# Patient Record
Sex: MALE | Race: WHITE | NOT HISPANIC OR LATINO | Employment: FULL TIME | URBAN - METROPOLITAN AREA
[De-identification: names, ages, dates, MRNs, and addresses within clinical notes are randomized per-mention and may not be internally consistent; named-entity substitution may affect disease eponyms.]

---

## 2017-05-08 ENCOUNTER — ALLSCRIPTS OFFICE VISIT (OUTPATIENT)
Dept: OTHER | Facility: OTHER | Age: 37
End: 2017-05-08

## 2017-05-08 ENCOUNTER — GENERIC CONVERSION - ENCOUNTER (OUTPATIENT)
Dept: OTHER | Facility: OTHER | Age: 37
End: 2017-05-08

## 2017-11-06 ENCOUNTER — GENERIC CONVERSION - ENCOUNTER (OUTPATIENT)
Dept: OTHER | Facility: OTHER | Age: 37
End: 2017-11-06

## 2018-01-12 NOTE — PROGRESS NOTES
Assessment    1  Encounter for preventive health examination (V70 0) (Z00 00)   2  Elevated blood pressure reading (796 2) (R03 0)   3  Frequent stools (787 91) (K52 9)    Plan  Elevated blood pressure reading    · (Q) CBC (H/H, RBC, INDICES, WBC, PLT); Status:Active; Requested for:08May2017;    · (Q) COMPREHENSIVE METABOLIC PNL W/ADJUSTED CALCIUM; Status:Active; Requested for:08May2017;    · (Q) LIPID PANEL WITH REFLEX TO DIRECT LDL; Status:Active; Requested  for:08May2017;    · (Q) TSH, 3RD GENERATION W/REFLEX TO FT4; Status:Active;  Requested  for:08May2017;   Frequent stools    · *1 -  [facility]  *  Status: Active   Requested for: 63EFX4442  Care Summary provided  : Yes  Health Maintenance    · Follow-up visit in 1 year Evaluation and Treatment  Follow-up  Status: Hold For -  Scheduling  Requested for: 33YXT9851   · Always use a seat belt and shoulder strap when riding or driving a motor vehicle ;  Status:Complete;   Done: 15CPI0531   · Begin a limited exercise program ; Status:Complete;   Done: 18OAQ2601   · Brush your teeth 3 times a day and floss at least once a day ; Status:Complete;   Done:  49KUR9045   · Drink plenty of fluids ; Status:Complete;   Done: 39BGF6865   · Eat a low fat and low cholesterol diet ; Status:Complete;   Done: 04WLT5095   · Keep a diary of when and what you eat ; Status:Complete;   Done: 57YKR2995   · Some eating tips that can help you lose weight ; Status:Complete;   Done: 81TQN7248   · Use a sun block product with an SPF of 15 or more ; Status:Complete;   Done:  03XLW9802   · Vitamins can help you get daily requirements that your diet may not be giving you ;  Status:Complete;   Done: 68WKQ2195   · We recommend routine visits to a dentist ; Status:Complete;   Done: 87KRX6777   · We recommend that you bring your body mass index down to 26 ; Status:Complete;    Done: 18WEI9583   · We recommend you modify your diet to achieve and maintain a healthy weight  Being  underweight may increase your risk of developing health problems from vitamin and  mineral deficiencies  We recommend a balanced diet rich in fruits and vegetables  You  may also consider increasing your calorie intake by eating more frequently or adding  nuts, avocados, and low-fat cheese or milk to your meals  Please let us know  if you would like to learn more about your nutrition and calorie needs, and additional  options to help you achieve your weight goals ; Status:Complete;   Done: 92ODU1790   · Call [phone] if: You have any warning signs of skin cancer ; Status:Complete;    Done: 22JSD2644    Discussion/Summary  Impression: health maintenance visit  Currently, he eats a healthy diet and eats an adequate diet  Prostate cancer screening: the risks and benefits of prostate cancer screening were discussed and PSA is not indicated  Testicular cancer screening: the risks and benefits of testicular cancer screening were discussed and monthly self testicular exam was advised  Colorectal cancer screening: the risks and benefits of colorectal cancer screening were discussed and colorectal cancer screening is not indicated  Screening lab work includes glucose and lipid profile  The patient declines immunizations  He was advised to be evaluated by an ophthalmologist and a dentist  Advice and education were given regarding weight bearing exercise, weight loss, calcium supplements, vitamin D supplements, alcohol use and sunscreen use  Patient discussion: discussed with the patient  Chief Complaint  PT here for new PCP and Physical      History of Present Illness  HM, Adult Male: The patient is being seen for a health maintenance evaluation  The last health maintenance visit was few year(s) ago  General Health: The patient's health since the last visit is described as good  He does not have regular dental visits  He denies vision problems  He denies hearing loss   Immunizations status: not up to date  Lifestyle:  He does not have a healthy diet  He does not have any weight concerns  He does not exercise regularly  He uses tobacco  The patient is a current cigarette smoker  Tobacco Use Duration: 2-3 cigarette pack(s) per day  He consumes alcohol  He denies drug use  Reproductive health:  the patient is sexually active  birth control is not being practiced  He denies erectile dysfunction  Screening: cancer screening reviewed and updated  metabolic screening reviewed and updated  risk screening reviewed and updated  HPI: wife c/o him going to bathroom after he eats more than 4-5 times a day  stool is not watery or loose   denies blood in stool      Review of Systems    Constitutional: No fever or chills, feels well, no tiredness, no recent weight gain or weight loss  Eyes: No complaints of eye pain, no red eyes, no discharge from eyes, no itchy eyes  ENT: no complaints of earache, no hearing loss, no nosebleeds, no nasal discharge, no sore throat, no hoarseness  Cardiovascular: No complaints of slow heart rate, no fast heart rate, no chest pain, no palpitations, no leg claudication, no lower extremity  Respiratory: No complaints of shortness of breath, no wheezing, no cough, no SOB on exertion, no orthopnea or PND  Gastrointestinal: No complaints of abdominal pain, no constipation, no nausea or vomiting, no diarrhea or bloody stools  Genitourinary: No complaints of dysuria, no incontinence, no hesitancy, no nocturia, no genital lesion, no testicular pain  Musculoskeletal: No complaints of arthralgia, no myalgias, no joint swelling or stiffness, no limb pain or swelling  Integumentary: No complaints of skin rash or skin lesions, no itching, no skin wound, no dry skin  Neurological: No compliants of headache, no confusion, no convulsions, no numbness or tingling, no dizziness or fainting, no limb weakness, no difficulty walking     Psychiatric: Is not suicidal, no sleep disturbances, no anxiety or depression, no change in personality, no emotional problems  Endocrine: No complaints of proptosis, no hot flashes, no muscle weakness, no erectile dysfunction, no deepening of the voice, no feelings of weakness  Hematologic/Lymphatic: No complaints of swollen glands, no swollen glands in the neck, does not bleed easily, no easy bruising  Past Medical History    · History of Bipolar disorder (296 80) (F31 9)   · History of STD (male) (099 9) (A64)    Surgical History    · Denied: History Of Prior Surgery    Family History  Brother    · Family history of Bipolar disorder  Maternal Grandfather    · Family history of cerebrovascular accident (CVA) (V17 1) (Z82 3)    Social History    · Occasional cigarette smoker (305 1) (Z72 0)   · Social drinker (V49 89) (Z78 9)    Allergies    1  No Known Drug Allergies    2  Seasonal    Vitals   Recorded: 82YLK7615 11:41AM Recorded: 37DGY1012 10:50AM   Heart Rate  84   Respiration  16   Systolic 731 mm Hg, LUE, Sitting 527 mm Hg   Diastolic 90 mm Hg, LUE, Sitting 92 mm Hg   Height  5 ft 9 21 in   Weight  222 lb    BMI Calculated  32 59 kg/m2   BSA Calculated  2 16 m2     Physical Exam    Constitutional   General appearance: No acute distress, well appearing and well nourished  Head and Face   Head and face: Normal     Palpation of the face and sinuses: No sinus tenderness  Eyes   Conjunctiva and lids: No erythema, swelling or discharge  Pupils and irises: Equal, round, reactive to light  Ophthalmoscopic examination: Normal fundi and optic discs  Ears, Nose, Mouth, and Throat   External inspection of ears and nose: Normal     Otoscopic examination: Tympanic membranes translucent with normal light reflex  Canals patent without erythema  Hearing: Normal     Nasal mucosa, septum, and turbinates: Normal without edema or erythema  Lips, teeth, and gums: Normal, good dentition      Oropharynx: Normal with no erythema, edema, exudate or lesions  Neck   Neck: Supple, symmetric, trachea midline, no masses  Thyroid: Normal, no thyromegaly  Pulmonary   Respiratory effort: No increased work of breathing or signs of respiratory distress  Percussion of chest: Normal     Palpation of chest: Normal     Auscultation of lungs: Clear to auscultation  Cardiovascular   Palpation of heart: Normal PMI, no thrills  Auscultation of heart: Normal rate and rhythm, normal S1 and S2, no murmurs  Carotid pulses: 2+ bilaterally  Examination of extremities for edema and/or varicosities: Normal     Lymphatic   Palpation of lymph nodes in neck: No lymphadenopathy  Palpation of lymph nodes in axillae: No lymphadenopathy  Musculoskeletal   Gait and station: Normal     Inspection/palpation of digits and nails: Normal without clubbing or cyanosis  Inspection/palpation of joints, bones, and muscles: Normal     Range of motion: Normal     Stability: Normal     Muscle strength/tone: Normal     Skin   Skin and subcutaneous tissue: Normal without rashes or lesions  Palpation of skin and subcutaneous tissue: Normal turgor  Neurologic   Cranial nerves: Cranial nerves 2-12 intact  Cortical function: Normal mental status  Reflexes: 2+ and symmetric  Sensation: No sensory loss  Coordination: Normal finger to nose and heel to shin  Psychiatric   Judgment and insight: Normal     Orientation to person, place and time: Normal     Recent and remote memory: Intact  Mood and affect: Normal        Results/Data  PHQ-2 Adult Depression Screening 02EDN5121 10:57AM User, Ahs     Test Name Result Flag Reference   PHQ-2 Adult Depression Score 0     Over the last two weeks, how often have you been bothered by any of the following problems?   Little interest or pleasure in doing things: Not at all - 0  Feeling down, depressed, or hopeless: Not at all - 0   PHQ-2 Adult Depression Screening Negative         Future Appointments    Date/Time Provider Specialty Site   06/15/2017 06:00 PM Iliana Nunez MD [address]   Electronically signed by : Andrés Ferrari MD; May  8 2017 11:45AM EST                       (Author)

## 2018-01-13 NOTE — MISCELLANEOUS
Message  GI Reminder Recall [facility]:   Date: 11/06/2017   Dear Vega BARRY:     Review of our records shows you are due for the following: Consult  Please call the following office to schedule your appointment:   [address] [facility], [address], [place], [address] [place] [address]  [phone]  We look forward to hearing from you!      Sincerely,     [facility]      Signatures   Electronically signed by : Jonathan Caputo, ; Nov 6 2017  4:10PM EST                       (Author)

## 2018-01-14 VITALS
BODY MASS INDEX: 32.88 KG/M2 | HEART RATE: 84 BPM | SYSTOLIC BLOOD PRESSURE: 132 MMHG | RESPIRATION RATE: 16 BRPM | WEIGHT: 222 LBS | HEIGHT: 69 IN | DIASTOLIC BLOOD PRESSURE: 90 MMHG

## 2020-07-30 ENCOUNTER — APPOINTMENT (OUTPATIENT)
Dept: RADIOLOGY | Facility: CLINIC | Age: 40
End: 2020-07-30
Payer: COMMERCIAL

## 2020-07-30 ENCOUNTER — OFFICE VISIT (OUTPATIENT)
Dept: URGENT CARE | Facility: CLINIC | Age: 40
End: 2020-07-30
Payer: COMMERCIAL

## 2020-07-30 VITALS
HEIGHT: 71 IN | SYSTOLIC BLOOD PRESSURE: 134 MMHG | TEMPERATURE: 97.9 F | WEIGHT: 253 LBS | RESPIRATION RATE: 20 BRPM | BODY MASS INDEX: 35.42 KG/M2 | DIASTOLIC BLOOD PRESSURE: 92 MMHG | HEART RATE: 97 BPM

## 2020-07-30 DIAGNOSIS — M25.561 ACUTE PAIN OF RIGHT KNEE: Primary | ICD-10-CM

## 2020-07-30 DIAGNOSIS — M25.561 ACUTE PAIN OF RIGHT KNEE: ICD-10-CM

## 2020-07-30 PROCEDURE — 73564 X-RAY EXAM KNEE 4 OR MORE: CPT

## 2020-07-30 RX ORDER — ESCITALOPRAM OXALATE 20 MG/1
20 TABLET ORAL DAILY
COMMUNITY

## 2020-07-30 NOTE — PATIENT INSTRUCTIONS
Rest, ice, and elevate right knee  Tylenol and Motrin as needed for pain or swelling   Follow up with orthopedics if pain persists    Knee Pain   WHAT YOU NEED TO KNOW:   Knee pain may start suddenly, or it may be a long-term problem  You may have pain on the side, front, or back of your knee  You may have knee stiffness and swelling  You may hear popping sounds or feel like your knee is giving way or locking up as you walk  You may feel pain when you sit, stand, walk, or climb up and down stairs  Knee pain can be caused by conditions such as obesity, inflammation, or strains or tears in ligaments or tendons  DISCHARGE INSTRUCTIONS:   Follow up with your healthcare provider within 24 hours or as directed: You may need follow-up treatments, such as steroid injections to decrease pain  Write down your questions so you remember to ask them during your visits  Self-care:   · Rest  your knee so it can heal  Limit activities that increase your pain  · Ice  can help reduce swelling  Wrap ice in a towel and put it on your knee for as long and as often as directed  · Compression  with a brace or bandage can help reduce swelling  Use a brace or bandage only as directed  · Elevation  helps decrease pain and swelling  Elevate your knee while you are sitting or lying down  Prop your leg on pillows to keep your knee above the level of your heart  Medicines:   · NSAIDs  help decrease swelling and pain or fever  This medicine is available with or without a doctor's order  NSAIDs can cause stomach bleeding or kidney problems in certain people  If you take blood thinner medicine, always ask your healthcare provider if NSAIDs are safe for you  Always read the medicine label and follow directions  · Acetaminophen  decreases pain and fever  It is available without a doctor's order  Ask how much to take and when to take it  Follow directions  Acetaminophen can cause liver damage if not taken correctly       · Take your medicine as directed  Contact your healthcare provider if you think your medicine is not helping or if you have side effects  Tell him or her if you are allergic to any medicine  Keep a list of the medicines, vitamins, and herbs you take  Include the amounts, and when and why you take them  Bring the list or the pill bottles to follow-up visits  Carry your medicine list with you in case of an emergency  Exercise as directed: You may need to see a physical therapist or do recommended exercises to improve movement and decrease your pain  You may be directed to walk, swim, or ride a bike  Follow your exercise plan exactly as directed to avoid further injury  Contact your healthcare provider if:   · You have questions or concerns about your condition or care  Return to the emergency department if:   · Your pain is worse, even after treatment  · You cannot bend or straighten your leg completely  · The swelling around your knee does not go down even with treatment  · Your knee is painful and hot to the touch  © 2017 [address] Information is for End User's use only and may not be sold, redistributed or otherwise used for commercial purposes  All illustrations and images included in CareNotes® are the copyrighted property of [employer] , [employer]  or [employer]  The above information is an  only  It is not intended as medical advice for individual conditions or treatments  Talk to your doctor, nurse or pharmacist before following any medical regimen to see if it is safe and effective for you

## 2020-07-30 NOTE — LETTER
July 30, 2020     Patient: Henry Cooper   YOB: 1980   Date of Visit: 7/30/2020       To Whom it May Concern:    Syeda Bazan was seen in my clinic on 7/30/2020  He may return to work on 8/1/2020  If you have any questions or concerns, please don't hesitate to call           Sincerely,          Minor Anger, CRISTINA      CC: No Recipients

## 2020-07-31 NOTE — PROGRESS NOTES
[facility]        NAME: Ines Ng is a 46 y o  male  : 1980    MRN: [id number]  DATE: 2020  TIME: 7:57 AM    Assessment and Plan   Acute pain of right knee [M25 561]  1  Acute pain of right knee  XR knee 4+ vw right injury     Work note provided for today and tomorrow  Patient asked if it could state "Covid free"  I informed him that he did not receive a Covid evaluation or test today to test for the virus  He asked for a test but was informed of the several day turn around time and the possibility of the out of pocket expense as he is asymptomatic  He declined at this time  Patient Instructions   Rest, ice, and elevate right knee  Tylenol and Motrin as needed for pain or swelling   Follow up with orthopedics if pain persists    Follow up with PCP in 3-5 days  Proceed to  ER if symptoms worsen  Chief Complaint     Chief Complaint   Patient presents with    Knee Injury     fell down stairs a couple hours ago  R knee         History of Present Illness       Maurice is a 46year old male presenting with right proximal knee pain  His knee gave out causing him to fall down 2 stairs  He landed on his right side  He got up without assistance and has been ambulating since fall  No OTC medications  Denies swelling, ecchymosis, N/T/W  Reports prior right knee injury in the past  He is also requesting a note for work for today and tomorrow  Review of Systems   Review of Systems   Constitutional: Negative for activity change, chills and fever  Respiratory: Negative for cough and shortness of breath  Musculoskeletal: Positive for arthralgias and gait problem  Skin: Negative for color change and wound  Neurological: Negative for weakness, numbness and headaches           Current Medications       Current Outpatient Medications:     escitalopram (LEXAPRO) 20 mg tablet, Take 20 mg by mouth daily, Disp: , Rfl:     Current Allergies     Allergies as of 2020    (No Known Allergies)            The following portions of the patient's history were reviewed and updated as appropriate: allergies, current medications, past family history, past medical history, past social history, past surgical history and problem list      Past Medical History:   Diagnosis Date    Anxiety        No past surgical history on file  Family History   Problem Relation Age of Onset    No Known Problems Mother     No Known Problems Father          Medications have been verified  Objective   /92 (Patient Position: Sitting)   Pulse 97   Temp 97 9 °F (36 6 °C) (Temporal)   Resp 20   Ht 5' 11" (1 803 m)   Wt 115 kg (253 lb)   BMI 35 29 kg/m²      Right knee xray: No osseous abnormality   Physical Exam     Physical Exam   Constitutional: He is oriented to person, place, and time  He appears well-developed and well-nourished  He is active  No distress  HENT:   Head: Normocephalic  Cardiovascular: Normal rate and regular rhythm  Pulmonary/Chest: Effort normal  No respiratory distress  Musculoskeletal:        Right knee: He exhibits normal range of motion, no swelling, no effusion, no ecchymosis, no deformity, no laceration, no erythema, normal alignment, no LCL laxity and no MCL laxity  Legs:  Neurological: He is alert and oriented to person, place, and time  He is not disoriented  Skin: Skin is warm, dry and intact

## 2020-08-13 ENCOUNTER — TELEPHONE (OUTPATIENT)
Dept: URGENT CARE | Facility: CLINIC | Age: 40
End: 2020-08-13

## 2020-08-13 DIAGNOSIS — Z20.89 CONTACT WITH AND (SUSPECTED) EXPOSURE TO OTHER COMMUNICABLE DISEASES: ICD-10-CM

## 2020-08-13 DIAGNOSIS — B34.9 INFECTION VIRAL: ICD-10-CM

## 2020-08-13 PROCEDURE — U0003 INFECTIOUS AGENT DETECTION BY NUCLEIC ACID (DNA OR RNA); SEVERE ACUTE RESPIRATORY SYNDROME CORONAVIRUS 2 (SARS-COV-2) (CORONAVIRUS DISEASE [COVID-19]), AMPLIFIED PROBE TECHNIQUE, MAKING USE OF HIGH THROUGHPUT TECHNOLOGIES AS DESCRIBED BY CMS-2020-01-R: HCPCS | Performed by: INTERNAL MEDICINE

## 2020-08-14 LAB — SARS-COV-2 RNA SPEC QL NAA+PROBE: NOT DETECTED

## 2020-09-24 DIAGNOSIS — Z20.89 CONTACT WITH AND (SUSPECTED) EXPOSURE TO OTHER COMMUNICABLE DISEASES: ICD-10-CM

## 2020-09-24 DIAGNOSIS — B34.9 INFECTION VIRAL: ICD-10-CM

## 2020-09-24 PROCEDURE — U0003 INFECTIOUS AGENT DETECTION BY NUCLEIC ACID (DNA OR RNA); SEVERE ACUTE RESPIRATORY SYNDROME CORONAVIRUS 2 (SARS-COV-2) (CORONAVIRUS DISEASE [COVID-19]), AMPLIFIED PROBE TECHNIQUE, MAKING USE OF HIGH THROUGHPUT TECHNOLOGIES AS DESCRIBED BY CMS-2020-01-R: HCPCS | Performed by: INTERNAL MEDICINE

## 2020-09-26 LAB — SARS-COV-2 RNA SPEC QL NAA+PROBE: NOT DETECTED

## 2020-11-06 DIAGNOSIS — B34.9 INFECTION VIRAL: ICD-10-CM

## 2020-11-06 DIAGNOSIS — Z20.89 CONTACT WITH AND (SUSPECTED) EXPOSURE TO OTHER COMMUNICABLE DISEASES: ICD-10-CM

## 2020-11-06 PROCEDURE — U0003 INFECTIOUS AGENT DETECTION BY NUCLEIC ACID (DNA OR RNA); SEVERE ACUTE RESPIRATORY SYNDROME CORONAVIRUS 2 (SARS-COV-2) (CORONAVIRUS DISEASE [COVID-19]), AMPLIFIED PROBE TECHNIQUE, MAKING USE OF HIGH THROUGHPUT TECHNOLOGIES AS DESCRIBED BY CMS-2020-01-R: HCPCS | Performed by: INTERNAL MEDICINE

## 2020-11-07 LAB — SARS-COV-2 RNA SPEC QL NAA+PROBE: NOT DETECTED

## 2021-03-08 ENCOUNTER — HOSPITAL ENCOUNTER (EMERGENCY)
Facility: HOSPITAL | Age: 41
Discharge: HOME | End: 2021-03-08
Attending: EMERGENCY MEDICINE
Payer: OTHER MISCELLANEOUS

## 2021-03-08 ENCOUNTER — APPOINTMENT (EMERGENCY)
Dept: RADIOLOGY | Facility: HOSPITAL | Age: 41
End: 2021-03-08
Payer: OTHER MISCELLANEOUS

## 2021-03-08 VITALS
RESPIRATION RATE: 18 BRPM | SYSTOLIC BLOOD PRESSURE: 155 MMHG | HEART RATE: 85 BPM | DIASTOLIC BLOOD PRESSURE: 89 MMHG | TEMPERATURE: 98.5 F | OXYGEN SATURATION: 97 %

## 2021-03-08 DIAGNOSIS — S42.009A CLAVICLE FRACTURE: Primary | ICD-10-CM

## 2021-03-08 PROCEDURE — 73030 X-RAY EXAM OF SHOULDER: CPT

## 2021-03-08 PROCEDURE — 99284 EMERGENCY DEPT VISIT MOD MDM: CPT | Performed by: PHYSICIAN ASSISTANT

## 2021-03-08 PROCEDURE — 99283 EMERGENCY DEPT VISIT LOW MDM: CPT

## 2021-03-08 RX ORDER — NAPROXEN 500 MG/1
500 TABLET ORAL 2 TIMES DAILY WITH MEALS
Qty: 30 TABLET | Refills: 0 | Status: SHIPPED | OUTPATIENT
Start: 2021-03-08 | End: 2021-07-06

## 2021-03-08 NOTE — Clinical Note
Julissa Bautista was seen and treated in our emergency department on 3/8/2021  Diagnosis:     Belén Evans  may return to work on return date  He may return on this date: 03/10/2021    Belén Evans will require a sling and may not use his right arm until cleared by Orthopedics  If you have any questions or concerns, please don't hesitate to call        Krunal Baltazar PA-C    ______________________________           _______________          _______________  Hospital Representative                              Date                                Time

## 2021-03-08 NOTE — ED PROVIDER NOTES
History  Chief Complaint   Patient presents with    Shoulder Injury     PT "lifted propane can at work and injured right shoulder"     The patient is a 56-year-old male with no significant past medical history who presents to the emergency department for evaluation of right shoulder injury  The patient reports he was at work lifting a heavy propane tank, and his  was not adequate causing the majority of the weight to pull onto his right shoulder  He reports he has been having a significant amount of pain since, and it is difficult to move his shoulder because of pain  He took 600 mg of Motrin, which she states did somewhat help as long as he is not moving his shoulder  The patient states he is left-hand dominant  He denies any numbness or tingling to right arm  He has no further complaints at this time  History provided by:  Patient   used: No    Shoulder Injury  Associated symptoms: no back pain, no fever and no neck pain        Prior to Admission Medications   Prescriptions Last Dose Informant Patient Reported? Taking?   escitalopram (LEXAPRO) 20 mg tablet  Self Yes Yes   Sig: Take 20 mg by mouth daily      Facility-Administered Medications: None       Past Medical History:   Diagnosis Date    Anxiety        History reviewed  No pertinent surgical history  Family History   Problem Relation Age of Onset    No Known Problems Mother     No Known Problems Father      I have reviewed and agree with the history as documented  E-Cigarette/Vaping    E-Cigarette Use Never User      E-Cigarette/Vaping Substances     Social History     Tobacco Use    Smoking status: Current Every Day Smoker     Packs/day: 0 25    Smokeless tobacco: Never Used   Substance Use Topics    Alcohol use: Yes     Comment: Occasional     Drug use: Never     Comment: Illicit drugs:   none  - As per [place]        Review of Systems   Constitutional: Negative for chills and fever     HENT: Negative for ear pain and sore throat  Eyes: Negative for redness and visual disturbance  Respiratory: Negative for cough, shortness of breath and wheezing  Cardiovascular: Negative for chest pain  Gastrointestinal: Negative for abdominal pain, diarrhea, nausea and vomiting  Genitourinary: Negative for dysuria and hematuria  Musculoskeletal: Positive for arthralgias and joint swelling  Negative for back pain, neck pain and neck stiffness  Skin: Negative for color change, rash and wound  Neurological: Negative for dizziness, light-headedness, numbness and headaches  Hematological: Does not bruise/bleed easily  All other systems reviewed and are negative  Physical Exam  Physical Exam  Constitutional:       Appearance: Normal appearance  HENT:      Head: Normocephalic and atraumatic  Nose: Nose normal    Eyes:      Extraocular Movements: Extraocular movements intact  Conjunctiva/sclera: Conjunctivae normal    Neck:      Musculoskeletal: Normal range of motion and neck supple  Pulmonary:      Effort: Pulmonary effort is normal  No respiratory distress  Musculoskeletal:      Right shoulder: He exhibits decreased range of motion, tenderness and bony tenderness  Right elbow: Normal        Arms:    Skin:     General: Skin is warm and dry  Neurological:      General: No focal deficit present  Mental Status: He is alert and oriented to person, place, and time  Sensory: No sensory deficit  Motor: Motor function is intact           Vital Signs  ED Triage Vitals [03/08/21 1355]   Temperature Pulse Respirations Blood Pressure SpO2   98 5 °F (36 9 °C) 89 18 170/87 98 %      Temp Source Heart Rate Source Patient Position - Orthostatic VS BP Location FiO2 (%)   Oral Monitor Sitting Left arm --      Pain Score       7           Vitals:    03/08/21 1355 03/08/21 1506   BP: 170/87 155/89   Pulse: 89 85   Patient Position - Orthostatic VS: Sitting Lying         Visual Acuity      ED Medications  Medications - No data to display    Diagnostic Studies  Results Reviewed     None                 XR shoulder 2+ views RIGHT   ED Interpretation by Maryam Lara PA-C (03/08 1506)   No acute bony abnormality       Final Result by Falguni Guajardo MD (03/08 1600)      Nondisplaced spiral fracture of the mid diaphysis of the clavicle  I personally discussed this study with Patricio Mueller on 3/8/2021 at 4:00 PM                   Workstation performed: [id number]                    Procedures  Orthopedic injury treatment    Date/Time: 3/8/2021 4:31 PM  Performed by: Maryam Lara PA-C  Authorized by: Maryam Lara PA-C     Patient Location:  ED  Other Assisting Provider: No    Verbal consent obtained?: Yes    Risks and benefits: Risks, benefits and alternatives were discussed    Consent given by:  Patient  Patient states understanding of procedure being performed: Yes    Patient identity confirmed:  Verbally with patient  Injury location:  Sternoclavicular  Location details:  Right clavicle  Injury type:  Fracture  Neurovascular status: Neurovascularly intact    Manipulation performed?: No    Immobilization:  Sling  Neurovascular status: Neurovascularly intact    Patient tolerance:  Patient tolerated the procedure well with no immediate complications             ED Course  ED Course as of Mar 08 1635   Mon Mar 08, 2021   1550 Patient is now able to range his shoulder he was on my initial evaluation, range of motion is still limited  Sling ordered  Jona a call from Radiology  Patient has a fractured clavicle  Patient was placed in a sling  Patient was given Orthopedic follow-up  MDM  Number of Diagnoses or Management Options  Clavicle fracture: new and requires workup  Diagnosis management comments: Patient presents for evaluation of right shoulder pain after sustaining an injury at work      Differential includes but is not limited to fracture versus dislocation versus muscle strain versus muscle tear versus ligament/tendon injury  X-ray of right shoulder ordered  Patient declines anything for pain in the emergency department  X-ray reviewed and discussed with Radiology  There is a mid clavicular fracture  I discussed this with the patient  Patient was placed in a sling and remains neurovascularly intact  There is no tenting of the skin  Patient was given information for orthopedics and advised follow-up  Patient is stable for discharge  Amount and/or Complexity of Data Reviewed  Tests in the radiology section of CPT®: ordered and reviewed  Decide to obtain previous medical records or to obtain history from someone other than the patient: yes  Review and summarize past medical records: yes  Discuss the patient with other providers: yes    Risk of Complications, Morbidity, and/or Mortality  Presenting problems: low  Diagnostic procedures: low  Management options: low    Patient Progress  Patient progress: stable      Disposition  Final diagnoses:   Clavicle fracture     Time reflects when diagnosis was documented in both MDM as applicable and the Disposition within this note     Time User Action Codes Description Comment    3/8/2021  4:07 PM Latanya [place] Add [id number] [place] Clavicle fracture       ED Disposition     ED Disposition Condition Date/Time Comment    Discharge Stable Mon Mar 8, 2021  4:07 PM Irina Breath discharge to home/self care              Follow-up Information     Follow up With Specialties Details Why Contact Info Additional [address] [facility] [place] [facility] Schedule an appointment as soon as possible for a visit in 1 week  [address] [facility] [address] [address] [address] [place] [facility] [address], [address], [address], Basil GONZALEZ, [facility] Emergency Department Emergency Medicine  If symptoms worsen [address] [facility] [address] [facility] Emergency Department, [address], [place] [facility] [place], [address]          Discharge Medication List as of 3/8/2021  4:08 PM      START taking these medications    Details   naproxen (NAPROSYN) 500 mg tablet Take 1 tablet (500 mg total) by mouth 2 (two) times a day with meals, Starting Mon 3/8/2021, Normal         CONTINUE these medications which have NOT CHANGED    Details   escitalopram (LEXAPRO) 20 mg tablet Take 20 mg by mouth daily, Historical Med           No discharge procedures on file      PDMP Review     None          ED Provider  Electronically Signed by           Norma Pepe PA-C  03/08/21 2785

## 2021-03-18 ENCOUNTER — OFFICE VISIT (OUTPATIENT)
Dept: OBGYN CLINIC | Facility: MEDICAL CENTER | Age: 41
End: 2021-03-18
Payer: OTHER MISCELLANEOUS

## 2021-03-18 VITALS
DIASTOLIC BLOOD PRESSURE: 96 MMHG | TEMPERATURE: 96.9 F | HEART RATE: 89 BPM | HEIGHT: 71 IN | WEIGHT: 253 LBS | SYSTOLIC BLOOD PRESSURE: 151 MMHG | BODY MASS INDEX: 35.42 KG/M2

## 2021-03-18 DIAGNOSIS — S42.024A CLOSED NONDISPLACED FRACTURE OF SHAFT OF RIGHT CLAVICLE, INITIAL ENCOUNTER: Primary | ICD-10-CM

## 2021-03-18 PROCEDURE — 23500 CLTX CLAVICULAR FX W/O MNPJ: CPT | Performed by: ORTHOPAEDIC SURGERY

## 2021-03-18 PROCEDURE — 99204 OFFICE O/P NEW MOD 45 MIN: CPT | Performed by: ORTHOPAEDIC SURGERY

## 2021-03-18 RX ORDER — IBUPROFEN 200 MG
200 TABLET ORAL EVERY 6 HOURS PRN
COMMUNITY
End: 2021-07-06

## 2021-03-18 RX ORDER — ATORVASTATIN CALCIUM 20 MG/1
20 TABLET, FILM COATED ORAL DAILY
COMMUNITY

## 2021-03-18 NOTE — PROGRESS NOTES
Ortho Sports Medicine Shoulder New Patient Visit     Assesment:   45 y o  male right nondisplaced spiral fracture of the mid diaphysis of clavicle DOI: 3/8/21 with possible nerve root irritation of the neck    Plan:    Conservative treatment:    Ice to shoulder 1-2 times daily, for 20 minutes at a time  PT for ROM and strengthening to shoulder, rotator cuff, scapular stabilizers  Work note written  ED Notes reviewed    Imaging: All imaging from today was reviewed by myself and explained to the patient  4 weeks new x-rays      Injection:    No Injection planned at this time  Surgery:     No surgery is recommended at this point, continue with conservative treatment plan as noted  Follow up:    Return in about 4 weeks (around 4/15/2021) for Recheck  Chief Complaint   Patient presents with    Right Shoulder - Fracture       History of Present Illness: The patient is a 45 y o , right hand dominant male whose occupation is unknown, referred to me by the emergency room, seen in clinic for evaluation of right shoulder pain  The patient denies a history of diabetes  The patient denies a history of thyroid disorder  Pain is located anterior, lateral   The patient rates the pain as a 8/10  The pain has been present for a few days  The patient sustained an injury on 3/8/2021  The mechanism of injury was when he was stacking propane tanks he went swing and put one up and missed  The weight of the tank pulled his arm down and he felt a sharp pain and crack in the shoulder  The pain is characterized as sharp, stabbing, dull, achy  The pain is present at all times  Pain is improved by rest, ice and bracing  Pain is aggravated by overhead activity, reaching back, rotation and lifting   Symptoms include clicking, catching, popping and cracking  The patient has weakness    The patient has numbness and tingling that extends into the fingers     The patient has tried rest, ice, NSAIDS and bracing  Shoulder Surgical History:  None    Past Medical, Social and Family History:  Past Medical History:   Diagnosis Date    Anxiety      History reviewed  No pertinent surgical history  No Known Allergies  Current Outpatient Medications on File Prior to Visit   Medication Sig Dispense Refill    atorvastatin (LIPITOR) 20 mg tablet Take 20 mg by mouth daily      escitalopram (LEXAPRO) 20 mg tablet Take 20 mg by mouth daily      ibuprofen (MOTRIN) 200 mg tablet Take 200 mg by mouth every 6 (six) hours as needed for mild pain      naproxen (NAPROSYN) 500 mg tablet Take 1 tablet (500 mg total) by mouth 2 (two) times a day with meals (Patient not taking: Reported on 3/18/2021) 30 tablet 0     No current facility-administered medications on file prior to visit        Social History     Socioeconomic History    Marital status: Single     Spouse name: Not on file    Number of children: Not on file    Years of education: 12/technical school     Highest education level: Not on file   Occupational History    Occupation:     Social Needs    Financial resource strain: Not on file    Food insecurity     Worry: Not on file     Inability: Not on file   [employer] needs     Medical: Not on file     Non-medical: Not on file   Tobacco Use    Smoking status: Current Every Day Smoker     Packs/day: 0 25    Smokeless tobacco: Never Used   Substance and Sexual Activity    Alcohol use: Yes     Comment: Occasional     Drug use: Never     Comment: Illicit drugs:   none  - As per Hollie Simpson Sexual activity: Not on file   Lifestyle    Physical activity     Days per week: Not on file     Minutes per session: Not on file    Stress: Not on file   Relationships    Social connections     Talks on phone: Not on file     Gets together: Not on file     Attends Sabianist service: Not on file     Active member of club or organization: Not on file     Attends meetings of clubs or organizations: Not on file     Relationship status: Not on file    Intimate partner violence     Fear of current or ex partner: Not on file     Emotionally abused: Not on file     Physically abused: Not on file     Forced sexual activity: Not on file   Other Topics Concern    Not on file   Social History Narrative    · Do you currently or have you served in the [employer]:   No      · Caffeine intake: Moderate      · Guns present in home:   No      · Seat belts used routinely:   Yes      · Sunscreen used routinely:   Yes      · Smoke alarm in home: Yes      · Advance directive:   No     As per Jake Kay      I have reviewed the past medical, surgical, social and family history, medications and allergies as documented in the EMR  Review of systems: ROS is negative other than that noted in the HPI  Constitutional: Negative for fatigue and fever  HENT: Negative for sore throat  Respiratory: Negative for shortness of breath  Cardiovascular: Negative for chest pain  Gastrointestinal: Negative for abdominal pain  Endocrine: Negative for cold intolerance and heat intolerance  Genitourinary: Negative for flank pain  Musculoskeletal: Negative for back pain  Skin: Negative for rash  Allergic/Immunologic: Negative for immunocompromised state  Neurological: Negative for dizziness  Psychiatric/Behavioral: Negative for agitation  Physical Exam:    Blood pressure 151/96, pulse 89, temperature (!) 96 9 °F (36 1 °C), height 5' 11" (1 803 m), weight 115 kg (253 lb)      General/Constitutional: NAD, well developed, well nourished  HENT: Normocephalic, atraumatic  CV: Intact distal pulses, regular rate  Resp: No respiratory distress or labored breathing  Lymphatic: No lymphadenopathy palpated  Neuro: Alert and Oriented x 3, no focal deficits  Psych: Normal mood, normal affect, normal judgement, normal behavior  Skin: Warm, dry, no rashes, no erythema    Shoulder focused exam:    RIGHT LEFT    Scapula Atrophy Negative Negative     Winging Negative Negative     Protraction Negative Negative    Rotator cuff SS 5/5 5/5     IS 5/5 5/5     SubS 5/5 5/5    ROM FF 95     170     ER0 45 60     IRb Iliac crest    T6    TTP: AC Positive Negative     Biceps Negative Negative     Coracoid Negative Negative    Special Tests: O'Briens Negative Negative     Velasquez-shear Negative Negative     Cross body Adduction Negative Negative     Speeds  Negative Negative     Eusebio's Negative Negative     Whipple Negative Negative       Neer Negative Negative     Sweeney Negative Negative    Instability: Apprehension & relocation not tested not tested     Load & shift not tested not tested    Other: Crank Negative Negative           Fracture / Dislocation Treatment    Date/Time: 3/18/2021 11:28 AM  Performed by: Leanna Napier DO  Authorized by: Leanna Napier DO     Patient Location:  Clinic  Other Assisting Provider: No    Verbal consent obtained?: Yes    Risks and benefits: Risks, benefits and alternatives were discussed    Consent given by:  Patient and parent  Patient states understanding of procedure being performed: Yes    Patient's understanding of procedure matches consent: Yes    Procedure consent matches procedure scheduled: Yes    Relevant documents present and verified: Yes    Test results available and properly labeled: Yes    Site marked: Yes    Radiology Images displayed and confirmed   If images not available, report reviewed: Yes    Patient identity confirmed:  Verbally with patient  Time out: Immediately prior to the procedure a time out was called    Injury location:  Sternoclavicular  Location details:  Right clavicle  Injury type:  Fracture  Neurovascular status: Neurovascularly intact    Distal perfusion: normal    Neurological function: normal    Range of motion: normal    Local anesthesia used?: No    Manipulation performed?: No    Immobilization:  Sling  Neurovascular status: Neurovascularly intact    Distal perfusion: normal Neurological function: normal    Range of motion: normal    Patient tolerance:  Patient tolerated the procedure well with no immediate complications        UE NV Exam: +2 Radial pulses bilaterally  Sensation intact to light touch C5-T1 bilaterally, Radial/median/ulnar nerve motor intact    Bilateral elbow, wrist, and and forearm ROM full, painless with passive ROM, no ttp or crepitance throughout extremities below shoulder joint    Cervical ROM is full without pain, numbness or tingling    Shoulder Imaging    X-rays of the right shoulder were reviewed, which demonstrate Nondisplaced spiral fracture of the mid diaphysis of the clavicle  I have reviewed the radiology report and agree with their impression      Scribe Attestation    I,:  Anai Rosario am acting as a scribe while in the presence of the attending physician :       I,:  Ginger Tafoya, DO personally performed the services described in this documentation    as scribed in my presence :

## 2021-03-18 NOTE — LETTER
March 18, 2021     Patient: Vaibhav Carlos   YOB: 1980   Date of Visit: 3/18/2021       To Whom it May Concern:    Wally Encarnacion is under my professional care  He was seen in my office on 3/18/2021  He should remain in the sling and will have no use the right arm at this time  He will return in 4 weeks  Please excuse him from work from 3/10-3/17 due to injury  If you have any questions or concerns, please don't hesitate to call           Sincerely,          Rebecca Gracia DO        CC: No Recipients

## 2021-03-24 ENCOUNTER — EVALUATION (OUTPATIENT)
Dept: PHYSICAL THERAPY | Facility: CLINIC | Age: 41
End: 2021-03-24
Payer: OTHER MISCELLANEOUS

## 2021-03-24 DIAGNOSIS — S42.024A CLOSED NONDISPLACED FRACTURE OF SHAFT OF RIGHT CLAVICLE, INITIAL ENCOUNTER: Primary | ICD-10-CM

## 2021-03-24 PROCEDURE — 97161 PT EVAL LOW COMPLEX 20 MIN: CPT | Performed by: PHYSICAL THERAPIST

## 2021-03-24 NOTE — PROGRESS NOTES
PT Evaluation     Today's date: 3/24/2021  Patient name: Israel Valentin  : 1980  MRN: [id number]  Referring provider: Alexys Jacobs DO  Dx:   Encounter Diagnosis     ICD-10-CM    1  Closed nondisplaced fracture of shaft of right clavicle, initial encounter  S42 024A Ambulatory referral to Physical Therapy                  Assessment  Assessment details: Israel Valentin is a 45 y o  male who presents to physical therapy with pain, decreased UE range of motion, decreased UE strength, impaired function, decreased activity tolerance and poor posture  Patient's clinical presentation is consistent with their referring diagnosis of Closed nondisplaced fracture of shaft of right clavicle, initial encounter  (primary encounter diagnosis)  The pt presents with functional limitations of ADLs, recreational activities, self-care and reaching  Pt would benefit from physical therapy services to address these limitations and maximize function  Pt was instructed and educated on good sitting posture today and demonstrates understanding  Impairments: abnormal muscle firing, abnormal muscle tone, abnormal or restricted ROM, activity intolerance, impaired physical strength, lacks appropriate home exercise program, pain with function, scapular dyskinesis, poor posture  and poor body mechanics  Understanding of Dx/Px/POC: good   Prognosis: good    Goals  Short term goals:  (4 weeks)  1  Patient will have pain level 2/10 right  shoulder at rest  2  Patient will report a 50% improvement in symptoms with ADL's  3  Patient will demonstrate appropriate scapulohumeral rhythm with reaching shoulder height and below  4  Patient will have improved right shoulder ROM by 20 degrees    Long term goals: (8 weeks)  1  Patient will report 85 % improvement improvement in symptoms with ADL's  2  Patient will have pain level 2/10 right shoulder with ADL's   3  Patient will demonstrate appropriate scapulohumeral rhythm with reaching overhead  4  Patient will be independent in a comprehensive home exercise program      Plan  Patient would benefit from: PT eval and skilled physical therapy  Planned modality interventions: cryotherapy and thermotherapy: hydrocollator packs  Planned therapy interventions: joint mobilization, manual therapy, massage, neuromuscular re-education, patient education, postural training, strengthening, stretching, therapeutic activities, ADL training, functional ROM exercises, home exercise program, abdominal trunk stabilization and therapeutic exercise  Frequency: 2x week  Duration in visits: 16  Duration in weeks: 8  Treatment plan discussed with: patient        Subjective Evaluation    History of Present Illness  Date of onset: 3/8/2021  Mechanism of injury: He was stacking propane tanks at work  He went to swing one up to about shoulder height and missed the landing spot  The weight of the tank pulled his arm down and he felt a sharp pain and crack in the right shoulder  He went to [facility]  An x-ray was taken which indicated right clavicle fracture  He was given a sling  He followed up with Dr Ceci Chen  He was then referred to PT  Pain  Current pain ratin  At best pain ratin  At worst pain ratin  Location: Right clavicle region and scapular region  Quality: dull ache, sharp and tight  Relieving factors: rest  Aggravating factors: lifting and overhead activity    Social Support    Employment status: working ( / [employer] worker - lifting)  Hand dominance: left  Exercise history: Video games    Patient Goals  Patient goals for therapy: return to work and independence with ADLs/IADLs          Objective     Postural Observations  Seated posture: fair        Palpation     Right   Hypertonic in the infraspinatus, levator scapulae, pectoralis major, rhomboids, supraspinatus and upper trapezius  Tenderness of the infraspinatus, levator scapulae, pectoralis major, rhomboids, supraspinatus and upper trapezius  Neurological Testing     Sensation     Shoulder   Left Shoulder   Intact: light touch    Right Shoulder   Intact: light touch    Passive Range of Motion   Left Shoulder   Flexion: 180 degrees   Abduction: 180 degrees   External rotation 0°: 90 degrees   Internal rotation 0°: 90 degrees     Right Shoulder   Flexion: 21 degrees   Abduction: 20 degrees   External rotation 0°: -9 degrees   Internal rotation 0°: 50 degrees     Strength/Myotome Testing     Left Shoulder     Planes of Motion   Flexion: 5   Abduction: 5   External rotation at 0°: 5   Internal rotation at 0°: 5     Right Shoulder     Planes of Motion   Flexion: 2+   Abduction: 2+   External rotation at 0°: 3-   Internal rotation at 0°: 3-              Precautions:  Gentle ROM and strengthening      Specialty Daily Treatment Diary       Manuals 3/24/21       Visit # 1       STM R UT, pec group        Gentle PROM R shld                        Warm-up        MH        Neuro Re-Ed        Posture correct        Scap squeeze        Gripping                        Ther Ex        Ball rolls        AROM flex        AROM ER        Cane ABD        Cane flex        Cane ER                Ther Activity                                Modalities        CP

## 2021-03-26 ENCOUNTER — OFFICE VISIT (OUTPATIENT)
Dept: PHYSICAL THERAPY | Facility: CLINIC | Age: 41
End: 2021-03-26
Payer: OTHER MISCELLANEOUS

## 2021-03-26 DIAGNOSIS — S42.024A CLOSED NONDISPLACED FRACTURE OF SHAFT OF RIGHT CLAVICLE, INITIAL ENCOUNTER: Primary | ICD-10-CM

## 2021-03-26 PROCEDURE — 97140 MANUAL THERAPY 1/> REGIONS: CPT | Performed by: PHYSICAL THERAPIST

## 2021-03-26 PROCEDURE — 97110 THERAPEUTIC EXERCISES: CPT | Performed by: PHYSICAL THERAPIST

## 2021-03-26 NOTE — PROGRESS NOTES
Daily Note     Today's date: 3/26/2021  Patient name: Kaushik Martinez  : 1980  MRN: [id number]  Referring provider: Clara Payton DO  Dx:   Encounter Diagnosis     ICD-10-CM    1  Closed nondisplaced fracture of shaft of right clavicle, initial encounter  S42 024A                   Subjective: Pain today = 5/10 right shld      Objective: See treatment diary below      Assessment: Tolerated treatment well  Patient demonstrated fatigue post treatment and would benefit from continued PT      Plan: Continue per plan of care  Progress treatment as tolerated  He agreed to follow limited pain guidelines when trying HEP       Precautions:  Gentle ROM and strengthening      Specialty Daily Treatment Diary       Manuals 3/24/21 3/26/21      Visit # 1 2      STM R UT, pec group  4'      Gentle PROM R shld  6'                      Warm-up          10 min      Neuro Re-Ed        Posture correct  10      Scap squeeze  30      Gripping  30                      Ther Ex        Ball rolls  20      AROM flex elbow  20      AROM ER        Cane ABD        Cane flex  10      Cane ER        Bent row                        Ther Activity                                Modalities        CP  5'

## 2021-03-30 ENCOUNTER — OFFICE VISIT (OUTPATIENT)
Dept: PHYSICAL THERAPY | Facility: CLINIC | Age: 41
End: 2021-03-30
Payer: OTHER MISCELLANEOUS

## 2021-03-30 DIAGNOSIS — S42.024A CLOSED NONDISPLACED FRACTURE OF SHAFT OF RIGHT CLAVICLE, INITIAL ENCOUNTER: Primary | ICD-10-CM

## 2021-03-30 PROCEDURE — 97140 MANUAL THERAPY 1/> REGIONS: CPT | Performed by: PHYSICAL THERAPIST

## 2021-03-30 PROCEDURE — 97110 THERAPEUTIC EXERCISES: CPT | Performed by: PHYSICAL THERAPIST

## 2021-03-30 NOTE — PROGRESS NOTES
Daily Note     Today's date: 3/30/2021  Patient name: J Luis Akbar  : 1980  MRN: [id number]  Referring provider: Isatu Solis DO  Dx:   Encounter Diagnosis     ICD-10-CM    1  Closed nondisplaced fracture of shaft of right clavicle, initial encounter  S42 024A                   Subjective: Pain today = 5/10 right shld      Objective: See treatment diary below      Assessment: Tolerated treatment well  Patient demonstrated fatigue post treatment and would benefit from continued PT  He had an improvement this session in his AROM with the guidelines of stopping for tightness or pain  Plan: Continue per plan of care  Progress treatment as tolerated  He agreed to follow limited pain guidelines when trying HEP  We added bent row to HEP this session       Precautions:  Gentle ROM and strengthening      Specialty Daily Treatment Diary       Manuals 3/24/21 3/26/21 3/30/21     Visit # 1 2 3     STM R UT, pec group  4' 4'     Gentle PROM R shld  6' 6'                     Warm-up          10 min 10 min     Neuro Re-Ed        Posture correct  10 10     Scap squeeze  30 30     Gripping  30 30                     Ther Ex        Ball rolls  20 30     AROM flex elbow  20 30     AROM ER        Cane ABD        Cane flex  10 10     Cane ER        Bent row   10                     Ther Activity                                Modalities        CP  5' 5'

## 2021-04-02 ENCOUNTER — OFFICE VISIT (OUTPATIENT)
Dept: PHYSICAL THERAPY | Facility: CLINIC | Age: 41
End: 2021-04-02
Payer: OTHER MISCELLANEOUS

## 2021-04-02 DIAGNOSIS — S42.024A CLOSED NONDISPLACED FRACTURE OF SHAFT OF RIGHT CLAVICLE, INITIAL ENCOUNTER: Primary | ICD-10-CM

## 2021-04-02 PROCEDURE — 97110 THERAPEUTIC EXERCISES: CPT | Performed by: PHYSICAL THERAPIST

## 2021-04-02 PROCEDURE — 97140 MANUAL THERAPY 1/> REGIONS: CPT | Performed by: PHYSICAL THERAPIST

## 2021-04-02 NOTE — PROGRESS NOTES
Daily Note     Today's date: 2021  Patient name: Elizabeth Emery  : 1980  MRN: [id number]  Referring provider: Reford Apgar, DO  Dx:   Encounter Diagnosis     ICD-10-CM    1  Closed nondisplaced fracture of shaft of right clavicle, initial encounter  S42 024A                   Subjective: Stepan Ignacio notes the last 2 days the pain has really calmed down  Objective: See treatment diary below      Assessment: Tolerated treatment well  He has posterior GH soreness with gentle ROM  A hold at end reange helped reduce the joint tension  Plan: Continue per plan of care  Progress treatment as tolerated         Precautions:  Gentle ROM and strengthening      Specialty Daily Treatment Diary       Manuals 3/24/21 3/26/21 3/30/21 4/2/21    Visit # 1 2 3 4    STM R UT, pec group  4' 4' 4'    Gentle PROM R shld  6' 6' 6'                    Warm-up          10 min 10 min 5'    Neuro Re-Ed        Posture correct  10 10 10    Scap squeeze  30 30 30    Gripping  30 30 30                    Ther Ex        Ball rolls  20 30 30    AROM flex elbow  20 30 30    AROM ER    20    Cane ABD        Cane flex  10 10 15    Cane ER        Bent row   10 20                    Ther Activity                                Modalities        CP  5' 5' 5'

## 2021-04-06 ENCOUNTER — OFFICE VISIT (OUTPATIENT)
Dept: PHYSICAL THERAPY | Facility: CLINIC | Age: 41
End: 2021-04-06
Payer: OTHER MISCELLANEOUS

## 2021-04-06 DIAGNOSIS — S42.024A CLOSED NONDISPLACED FRACTURE OF SHAFT OF RIGHT CLAVICLE, INITIAL ENCOUNTER: Primary | ICD-10-CM

## 2021-04-06 PROCEDURE — 97140 MANUAL THERAPY 1/> REGIONS: CPT | Performed by: PHYSICAL THERAPIST

## 2021-04-06 PROCEDURE — 97110 THERAPEUTIC EXERCISES: CPT | Performed by: PHYSICAL THERAPIST

## 2021-04-09 ENCOUNTER — OFFICE VISIT (OUTPATIENT)
Dept: PHYSICAL THERAPY | Facility: CLINIC | Age: 41
End: 2021-04-09
Payer: OTHER MISCELLANEOUS

## 2021-04-09 DIAGNOSIS — S42.024A CLOSED NONDISPLACED FRACTURE OF SHAFT OF RIGHT CLAVICLE, INITIAL ENCOUNTER: Primary | ICD-10-CM

## 2021-04-09 PROCEDURE — 97110 THERAPEUTIC EXERCISES: CPT | Performed by: PHYSICAL THERAPIST

## 2021-04-09 PROCEDURE — 97140 MANUAL THERAPY 1/> REGIONS: CPT | Performed by: PHYSICAL THERAPIST

## 2021-04-09 NOTE — PROGRESS NOTES
Daily Note     Today's date: 2021  Patient name: Kendra Hamman  : 1980  MRN: [id number]  Referring provider: Panchito Brewster DO  Dx:   Encounter Diagnosis     ICD-10-CM    1  Closed nondisplaced fracture of shaft of right clavicle, initial encounter  S42 024A                   Subjective:  Pain 3/10 in C-S and upper shoulder but not in [facility] of clavicle area  Objective: See treatment diary below      Assessment: Tolerated treatment well  ROM restrictions persist Right shld due to oMises's complaint of tightness feeling or sensation of pain  His ROM is however making steady progress  Plan: Continue per plan of care  Progress treatment as tolerated         Precautions:  Gentle ROM and strengthening      Specialty Daily Treatment Diary       Manuals 21       Visit # 6       STM R UT, pec group 4'       Gentle PROM R shld 6'                       Warm-up         5 min       Neuro Re-Ed        Scap squeeze 30       Gripping 30                       Ther Ex        Ball rolls 30         AROM flex elbow 30       AROM ER 20       Cane ABD 10       Cane flex 20       Cane ER 20       Bent row 30                       Ther Activity                                Modalities        CP 5'

## 2021-04-12 ENCOUNTER — TELEPHONE (OUTPATIENT)
Dept: OBGYN CLINIC | Facility: HOSPITAL | Age: 41
End: 2021-04-12

## 2021-04-12 NOTE — TELEPHONE ENCOUNTER
Salina Fallon from [employer] is calling for the patient next & last appt  Salina Fallon is asking that I fax the last ovn & work status to her at [phone], info was faxed

## 2021-04-13 ENCOUNTER — OFFICE VISIT (OUTPATIENT)
Dept: PHYSICAL THERAPY | Facility: CLINIC | Age: 41
End: 2021-04-13
Payer: OTHER MISCELLANEOUS

## 2021-04-13 DIAGNOSIS — S42.024A CLOSED NONDISPLACED FRACTURE OF SHAFT OF RIGHT CLAVICLE, INITIAL ENCOUNTER: Primary | ICD-10-CM

## 2021-04-13 PROCEDURE — 97140 MANUAL THERAPY 1/> REGIONS: CPT | Performed by: PHYSICAL THERAPIST

## 2021-04-13 PROCEDURE — 97110 THERAPEUTIC EXERCISES: CPT | Performed by: PHYSICAL THERAPIST

## 2021-04-13 NOTE — PROGRESS NOTES
Daily Note     Today's date: 2021  Patient name: Marquita Tiwari  : 1980  MRN: [id number]  Referring provider: Paul Coyle DO  Dx:   Encounter Diagnosis     ICD-10-CM    1  Closed nondisplaced fracture of shaft of right clavicle, initial encounter  S42 024A                   Subjective:  Pain 4/10 throughout right upper quarter today  He feels it is due to bad weather yesterday  Objective: See treatment diary below      Assessment: Tolerated treatment well  He has less soreness with ROM activities and more tightness  He agreed to mildly allow ROM to move into tightness as long as it was pain free  Plan: Continue per plan of care  Progress treatment as tolerated         Precautions:  Gentle ROM and strengthening      Specialty Daily Treatment Diary       Manuals 21      Visit # 6 7      STM R UT, pec group 4' 4'      Gentle PROM R shld 6' 6'                      Warm-up         5 min 5'      Neuro Re-Ed        Scap squeeze 30 30      Gripping 30                       Ther Ex        Ball rolls 30 30        AROM flex elbow 30 30      AROM ER 20 30      Cane ABD 10 10      Cane flex 20 20      Cane ER 20 20      Bent row 30 30      TB row   20 YTB              Ther Activity                                Modalities        CP 5' 5 min

## 2021-04-15 ENCOUNTER — OFFICE VISIT (OUTPATIENT)
Dept: OBGYN CLINIC | Facility: MEDICAL CENTER | Age: 41
End: 2021-04-15
Payer: OTHER MISCELLANEOUS

## 2021-04-15 VITALS
HEIGHT: 71 IN | WEIGHT: 254.4 LBS | DIASTOLIC BLOOD PRESSURE: 107 MMHG | BODY MASS INDEX: 35.61 KG/M2 | SYSTOLIC BLOOD PRESSURE: 148 MMHG | HEART RATE: 97 BPM

## 2021-04-15 DIAGNOSIS — M24.811 INTERNAL DERANGEMENT OF RIGHT SHOULDER: Primary | ICD-10-CM

## 2021-04-15 DIAGNOSIS — S42.024A CLOSED NONDISPLACED FRACTURE OF SHAFT OF RIGHT CLAVICLE, INITIAL ENCOUNTER: ICD-10-CM

## 2021-04-15 DIAGNOSIS — M54.12 RADICULOPATHY, CERVICAL REGION: ICD-10-CM

## 2021-04-15 PROCEDURE — 99213 OFFICE O/P EST LOW 20 MIN: CPT | Performed by: ORTHOPAEDIC SURGERY

## 2021-04-15 RX ORDER — LORAZEPAM 1 MG/1
0.5 TABLET ORAL ONCE
Qty: 1 TABLET | Refills: 0 | Status: SHIPPED | OUTPATIENT
Start: 2021-04-15 | End: 2021-07-06

## 2021-04-15 NOTE — PROGRESS NOTES
Ortho Sports Medicine Shoulder Follow Up Visit     Assesment:   45 y o  male  right nondisplaced spiral fracture of the mid diaphysis of clavicle DOI: 3/8/21 with possible cervical nerve root irritation, but more concerning is possible rotator cuff tear -MRI ordered    Plan:    Conservative treatment:    Ice to shoulder 1-2 times daily, for 20 minutes at a time  Referral to Pain Management to look into the neck/hand numbness and  tingling   Work note written    Imaging: All imaging from today was reviewed by myself and explained to the patient  MRI of the right shoulder ordered to rule out rotator cuff tear  He will be prescribed Ativan for his claustrophobia     Injection:    No Injection planned at this time  Surgery:     No surgery is recommended at this point, continue with conservative treatment plan as noted  Follow up:    Return after MRI  Chief Complaint   Patient presents with    Right Shoulder - Follow-up     History of Present Illness: The patient is returns for follow up of ight nondisplaced spiral fracture of the mid diaphysis of clavicle   Since the prior visit, He reports minimal improvement  Patient states he notes significant weakness in the right shoulder although it has gotten better over the past couple weeks  He has pain at night that wakes him up from sleep  Pain is improved by rest, ice and physical therapy  Pain is aggravated by overhead activity, reaching back, rotation and lifting  Symptoms include clicking, catching and popping  The patient has weakness  The patient has tried rest, ice, NSAIDS and physical therapy  Shoulder Surgical History:  None    Past Medical, Social and Family History:  Past Medical History:   Diagnosis Date    Anxiety      History reviewed  No pertinent surgical history    Allergies   Allergen Reactions    Pollen Extract Other (See Comments)     Current Outpatient Medications on File Prior to Visit   Medication Sig Dispense Refill    atorvastatin (LIPITOR) 20 mg tablet Take 20 mg by mouth daily      escitalopram (LEXAPRO) 20 mg tablet Take 20 mg by mouth daily      ibuprofen (MOTRIN) 200 mg tablet Take 200 mg by mouth every 6 (six) hours as needed for mild pain      naproxen (NAPROSYN) 500 mg tablet Take 1 tablet (500 mg total) by mouth 2 (two) times a day with meals (Patient not taking: Reported on 4/15/2021) 30 tablet 0     No current facility-administered medications on file prior to visit        Social History     Socioeconomic History    Marital status: Single     Spouse name: Not on file    Number of children: Not on file    Years of education: 12/technical school     Highest education level: Not on file   Occupational History    Occupation:     Social Needs    Financial resource strain: Not on file    Food insecurity     Worry: Not on file     Inability: Not on file   [employer] needs     Medical: Not on file     Non-medical: Not on file   Tobacco Use    Smoking status: Current Every Day Smoker     Packs/day: 0 25    Smokeless tobacco: Never Used   Substance and Sexual Activity    Alcohol use: Yes     Comment: Occasional     Drug use: Never     Comment: Illicit drugs:   none  - As per Jose Mckeon Sexual activity: Not on file   Lifestyle    Physical activity     Days per week: Not on file     Minutes per session: Not on file    Stress: Not on file   Relationships    Social connections     Talks on phone: Not on file     Gets together: Not on file     Attends [employer] service: Not on file     Active member of club or organization: Not on file     Attends meetings of clubs or organizations: Not on file     Relationship status: Not on file    Intimate partner violence     Fear of current or ex partner: Not on file     Emotionally abused: Not on file     Physically abused: Not on file     Forced sexual activity: Not on file   Other Topics Concern    Not on file   Social History Narrative · Do you currently or have you served in [employer]:   No      · Caffeine intake: Moderate      · Guns present in home:   No      · Seat belts used routinely:   Yes      · Sunscreen used routinely:   Yes      · Smoke alarm in home: Yes      · Advance directive:   No     As per [place]        I have reviewed the past medical, surgical, social and family history, medications and allergies as documented in the EMR  Review of systems: ROS is negative other than that noted in the HPI  Constitutional: Negative for fatigue and fever  Physical Exam:    Blood pressure (!) 148/107, pulse 97, height 5' 11" (1 803 m), weight 115 kg (254 lb 6 4 oz)      General/Constitutional: NAD, well developed, well nourished  HENT: Normocephalic, atraumatic  CV: Intact distal pulses, regular rate  Resp: No respiratory distress or labored breathing  Lymphatic: No lymphadenopathy palpated  Neuro: Alert and Oriented x 3, no focal deficits  Psych: Normal mood, normal affect, normal judgement, normal behavior  Skin: Warm, dry, no rashes, no erythema      Shoulder focused exam:       RIGHT LEFT    Scapula Atrophy Negative Negative     Winging Negative Negative     Protraction Negative Negative    Rotator cuff SS 4/5 5/5     IS 4/5 5/5     SubS 5/5 5/5    ROM     170     ER0 60 60     ER90 90    90     IR90 T6    T6     IRb T6    T6    TTP: AC Negative Negative     Biceps Negative Negative     Coracoid Negative Negative    Special Tests: O'Briens Negative Negative     Velasquez-shear Negative Negative     Cross body Adduction Negative Negative     Speeds  Negative Negative     Eusebio's Negative Negative     Whipple Positive Negative       Neer Negative Negative     Sweeney Negative Negative    Instability: Apprehension & relocation not tested not tested     Load & shift not tested not tested    Other: Crank Negative Negative               UE NV Exam: +2 Radial pulses bilaterally  Sensation intact to light touch C5-T1 bilaterally, Radial/median/ulnar nerve motor intact    Cervical ROM is full without pain, numbness or tingling      Shoulder Imaging    No imaging was performed today      Scribe Attestation    I,:  Joy Smith am acting as a scribe while in the presence of the attending physician :       I,:  Nicko Cardenas, DO personally performed the services described in this documentation    as scribed in my presence :

## 2021-04-15 NOTE — LETTER
April 15, 2021     Patient: [place]   YOB: 1980   Date of Visit: 4/15/2021       To Whom it May Concern:    Kaylee Bobby is under my professional care  He was seen in my office on 4/15/2021  He should not use his right arm at this time  He will return in the next 2 weeks for MRI follow up  If you have any questions or concerns, please don't hesitate to call           Sincerely,          Henny Weir DO        CC: No Recipients

## 2021-04-16 ENCOUNTER — OFFICE VISIT (OUTPATIENT)
Dept: PHYSICAL THERAPY | Facility: CLINIC | Age: 41
End: 2021-04-16
Payer: OTHER MISCELLANEOUS

## 2021-04-16 ENCOUNTER — TELEPHONE (OUTPATIENT)
Dept: OBGYN CLINIC | Facility: HOSPITAL | Age: 41
End: 2021-04-16

## 2021-04-16 DIAGNOSIS — S42.024D CLOSED NONDISPLACED FRACTURE OF SHAFT OF RIGHT CLAVICLE WITH ROUTINE HEALING, SUBSEQUENT ENCOUNTER: Primary | ICD-10-CM

## 2021-04-16 PROCEDURE — 97110 THERAPEUTIC EXERCISES: CPT | Performed by: PHYSICAL THERAPIST

## 2021-04-16 PROCEDURE — 97112 NEUROMUSCULAR REEDUCATION: CPT | Performed by: PHYSICAL THERAPIST

## 2021-04-16 NOTE — TELEPHONE ENCOUNTER
Dr Keane Has    Electronically faxed 4/15 OVN to [employer] from [place]       Fax # [phone]

## 2021-04-16 NOTE — PROGRESS NOTES
Daily Note     Today's date: 2021  Patient name: Manju Alvarado  : 1980  MRN: [id number]  Referring provider: Tracy Mcgrath DO  Dx:   Encounter Diagnosis     ICD-10-CM    1  Closed nondisplaced fracture of shaft of right clavicle with routine healing, subsequent encounter  S42 862D          Subjective:  Pain 2/10 throughout right upper quarter today  Pt states he saw Dr Vicky Felix and has MRI ordered for R shoulder to r/o R RTC tear  Pt states he also has numbness in R thumb and first 2 digits intermittently  MD concerned re: possible cervical involvement due to numbness reported R UE  Objective: See treatment diary below    Assessment: Tolerated treatment well  Pt able to abolish numbness symptoms in R thumb and first 2 fingers with cervical retraction  Educated pt re: maintaining cervical retraction with UE therex  Pt weak and deconditioned and having difficulty reaching full AROM R UE  Added nerve glides supine to assist with decreasing c/o numbness R thumb and first 2 fingers  Plan: Continue per plan of care  Progress treatment as tolerated  F/u with pt re: date for R shoulder MRI       Precautions:  Gentle ROM and strengthening      Specialty Daily Treatment Diary       Manuals 21     Visit # 6 7 8     STM R UT, pec group 4' 4' 4' plus median/radial nerve glides     Gentle PROM R shld 6' 6' 6'                     Warm-up         5 min 5' 10 min R shoulder seated     Neuro Re-Ed        Scap squeeze 30 30 x30 with cervical retraction     Gripping 30                       Ther Ex        Ball rolls 30 30   -     AROM flex elbow 30 30 x30  With cervical retraction     AROM ER 20 30 x20     Cane ABD 10 10 x7 with numbness R fingers     Cane flex 20 20 x20     Cane ER 20 20 x20     Bent row 30 30 -     TB row   20 YTB x20 green TB hold 5        tricep pull down x10, x5 green TB hold 5 ea     Ther Activity                                Modalities        CP 5' 5 min 6 min R shoulder seated

## 2021-04-20 ENCOUNTER — OFFICE VISIT (OUTPATIENT)
Dept: PHYSICAL THERAPY | Facility: CLINIC | Age: 41
End: 2021-04-20
Payer: OTHER MISCELLANEOUS

## 2021-04-20 DIAGNOSIS — S42.024D CLOSED NONDISPLACED FRACTURE OF SHAFT OF RIGHT CLAVICLE WITH ROUTINE HEALING, SUBSEQUENT ENCOUNTER: Primary | ICD-10-CM

## 2021-04-20 DIAGNOSIS — S42.024A CLOSED NONDISPLACED FRACTURE OF SHAFT OF RIGHT CLAVICLE, INITIAL ENCOUNTER: ICD-10-CM

## 2021-04-20 PROCEDURE — 97140 MANUAL THERAPY 1/> REGIONS: CPT | Performed by: PHYSICAL THERAPIST

## 2021-04-20 PROCEDURE — 97110 THERAPEUTIC EXERCISES: CPT | Performed by: PHYSICAL THERAPIST

## 2021-04-20 NOTE — PROGRESS NOTES
Daily Note     Today's date: 2021  Patient name: [place] Cooney  : 1980  MRN: [id number]  Referring provider: Kimberlee Devine DO  Dx:   Encounter Diagnosis     ICD-10-CM    1  Closed nondisplaced fracture of shaft of right clavicle with routine healing, subsequent encounter  S42 024D    2  Closed nondisplaced fracture of shaft of right clavicle, initial encounter  S42 024A          Subjective:  Pain = 2/10 today    Objective: See treatment diary below    Assessment: Tolerated treatment well  He had almost full IR and ER ROM today  Flexion has end range restrictions but is making steady improvement  He needed verbal and visual cues to prevent Right scapular hike substitution with AROM to 90 degrees  Plan: Continue per plan of care  Progress treatment as tolerated          Precautions:  Gentle ROM and strengthening      Specialty Daily Treatment Diary       Manuals 21    Visit # 6 7 8 9    STM R UT, pec group 4' 4' 4' plus median/radial nerve glides 4'    Gentle PROM R shld 6' 6' 6' 6'                    Warm-up         5 min 5' 10 min R shoulder seated 10 min    Neuro Re-Ed        Scap squeeze 30 30 x30 with cervical retraction -    Gripping 30   -    Pulleys    20    AROM flex / abd to 90    10            Ther Ex        Ball rolls 30 30   -     AROM flex elbow 30 30 x30  With cervical retraction Bicep curl 30 3#    AROM ER 20 30 x20 20    Cane ABD 10 10 x7 with numbness R fingers     Cane flex 20 20 x20 20    Cane ER 20 20 x20 20    Bent row 30 30 - 30   3#    TB row   20 YTB x20 green TB hold 5 TB row and ext  20 ea  GTB       tricep pull down x10, x5 green TB hold 5 ea 20   22 5 #  CC    Wall slides    20                    Ther Activity                                Modalities        CP 5' 5 min 6 min R shoulder seated 5 min

## 2021-04-21 ENCOUNTER — TELEPHONE (OUTPATIENT)
Dept: OBGYN CLINIC | Facility: HOSPITAL | Age: 41
End: 2021-04-21

## 2021-04-22 ENCOUNTER — APPOINTMENT (OUTPATIENT)
Dept: PHYSICAL THERAPY | Facility: CLINIC | Age: 41
End: 2021-04-22
Payer: OTHER MISCELLANEOUS

## 2021-04-23 ENCOUNTER — APPOINTMENT (OUTPATIENT)
Dept: PHYSICAL THERAPY | Facility: CLINIC | Age: 41
End: 2021-04-23
Payer: OTHER MISCELLANEOUS

## 2021-04-29 ENCOUNTER — APPOINTMENT (OUTPATIENT)
Dept: PHYSICAL THERAPY | Facility: CLINIC | Age: 41
End: 2021-04-29
Payer: OTHER MISCELLANEOUS

## 2021-05-03 ENCOUNTER — TELEPHONE (OUTPATIENT)
Dept: OBGYN CLINIC | Facility: HOSPITAL | Age: 41
End: 2021-05-03

## 2021-05-03 ENCOUNTER — TELEPHONE (OUTPATIENT)
Dept: OBGYN CLINIC | Facility: CLINIC | Age: 41
End: 2021-05-03

## 2021-05-03 DIAGNOSIS — M24.811 INTERNAL DERANGEMENT OF RIGHT SHOULDER: Primary | ICD-10-CM

## 2021-05-03 NOTE — TELEPHONE ENCOUNTER
# [phone]    [facility] states they are arranging pen Mri for patient but they need a copy of the MRI order signed by dr Jacqui Ayoub    Can the office assist in faxing MRI order to [facility]:     FAX# [phone] ATTN: REF# [id number]

## 2021-05-03 NOTE — TELEPHONE ENCOUNTER
Patient sees Dr Eloy Hamlin  Patient is calling in stating that he was supposed to be seen today for his MRI on his right shoulder at radiology of [address] [place]  He was not able to go through with this due to him not fitting properly in the tube, it was too tight and a hard time breathing  The patient is stating that he would need an Open MRI or something with a bigger machine  He is asking for a call back relating this          Call back# [phone]

## 2021-05-05 ENCOUNTER — OFFICE VISIT (OUTPATIENT)
Dept: PHYSICAL THERAPY | Facility: CLINIC | Age: 41
End: 2021-05-05
Payer: OTHER MISCELLANEOUS

## 2021-05-05 DIAGNOSIS — S42.024D CLOSED NONDISPLACED FRACTURE OF SHAFT OF RIGHT CLAVICLE WITH ROUTINE HEALING, SUBSEQUENT ENCOUNTER: Primary | ICD-10-CM

## 2021-05-05 DIAGNOSIS — S42.024A CLOSED NONDISPLACED FRACTURE OF SHAFT OF RIGHT CLAVICLE, INITIAL ENCOUNTER: ICD-10-CM

## 2021-05-05 PROCEDURE — 97110 THERAPEUTIC EXERCISES: CPT

## 2021-05-05 PROCEDURE — 97112 NEUROMUSCULAR REEDUCATION: CPT

## 2021-05-05 PROCEDURE — 97140 MANUAL THERAPY 1/> REGIONS: CPT

## 2021-05-05 NOTE — PROGRESS NOTES
Daily Note     Today's date: 2021  Patient name: Franklin Beard  : 1980  MRN: [id number]  Referring provider: Michael Jackson DO  Dx:   Encounter Diagnosis     ICD-10-CM    1  Closed nondisplaced fracture of shaft of right clavicle with routine healing, subsequent encounter  S42 024D    2  Closed nondisplaced fracture of shaft of right clavicle, initial encounter  S42 024A          Subjective:  Pt reports he was not here last week due to girlfriends car breaking down  He reports he is getting MRI this Friday on R shld for suspected RTC tear  Objective: See treatment diary below    Assessment: Tolerated treatment well  Plan: Continue per plan of care  Progress treatment as tolerated          Precautions:  Gentle ROM and strengthening      Specialty Daily Treatment Diary       Manuals 21   Visit # 6 7 8 9 10   STM R UT, pec group 4' 4' 4' plus median/radial nerve glides 4' 5'   Gentle PROM R shld 6' 6' 6' 6' 5'                   Warm-up         5 min 5' 10 min R shoulder seated 10 min 10 min   Neuro Re-Ed        Scap squeeze 30 30 x30 with cervical retraction - x30 with cervical retraction   Gripping 30   -    Pulleys    20 20   AROM flex / abd to 90    10            Ther Ex        Ball rolls 30 30   -     AROM flex elbow 30 30 x30  With cervical retraction Bicep curl 30 3# Bicep curl 30 3#   AROM ER 20 30 x20 20 20 SL   Cane ABD 10 10 x7 with numbness R fingers     Cane flex 20 20 x20 20    Cane ER 20 20 x20 20    Bent row 30 30 - 30   3# 30 3#   TB row   20 YTB x20 green TB hold 5 TB row and ext  20 ea  GTB TB row and ext  20 ea  GTB      tricep pull down x10, x5 green TB hold 5 ea 20   22 5 #  CC 20   22 5 #  CC   Wall slides    20 20                   Ther Activity                                Modalities        CP 5' 5 min 6 min R shoulder seated 5 min

## 2021-05-13 ENCOUNTER — OFFICE VISIT (OUTPATIENT)
Dept: OBGYN CLINIC | Facility: MEDICAL CENTER | Age: 41
End: 2021-05-13

## 2021-05-13 VITALS
DIASTOLIC BLOOD PRESSURE: 93 MMHG | SYSTOLIC BLOOD PRESSURE: 160 MMHG | WEIGHT: 254 LBS | BODY MASS INDEX: 35.56 KG/M2 | HEIGHT: 71 IN | HEART RATE: 98 BPM

## 2021-05-13 DIAGNOSIS — M24.811 INTERNAL DERANGEMENT OF RIGHT SHOULDER: Primary | ICD-10-CM

## 2021-05-13 DIAGNOSIS — S42.024A CLOSED NONDISPLACED FRACTURE OF SHAFT OF RIGHT CLAVICLE, INITIAL ENCOUNTER: ICD-10-CM

## 2021-05-13 PROCEDURE — 99024 POSTOP FOLLOW-UP VISIT: CPT | Performed by: ORTHOPAEDIC SURGERY

## 2021-05-13 NOTE — LETTER
May 13, 2021     Patient: [place] [facility]   YOB: 1980   Date of Visit: 5/13/2021       To Whom it May Concern:    Rufina Padilla is under my professional care  He was seen in my office on 5/13/2021  He may return to work on Monday 6/07/2021 full duty at that time  If you have any questions or concerns, please don't hesitate to call           Sincerely,          Edith Patel DO        CC: No Recipients

## 2021-05-13 NOTE — PROGRESS NOTES
Ortho Sports Medicine Shoulder Follow Up Visit     Assesment:   45 y o  male right shoulder fracture of the mid diaphysis of the clavicle sustained on 3/08/2021  Plan:    Conservative treatment:    Ice to shoulder 1-2 times daily, for 20 minutes at a time  PT for continued ROM, stretching and strengthening  Work note provided to return to work full duty 6/07/2021      Imaging: All imaging from today was reviewed by myself and explained to the patient  MRI of the right shoulder performed on 5/07/2021 was personally reviewed by me in office and demonstrate no evidence of a rotator cuff tear noted  Injection:    No Injection planned at this time  Surgery:     No surgery is recommended at this point, continue with conservative treatment plan as noted  Follow up:    Return in about 6 weeks (around 6/24/2021) for Recheck right shoulder   Chief Complaint   Patient presents with    Right Shoulder - Follow-up         History of Present Illness: The patient is returns for follow up of right nondisplaced spiral fracture of the mid diaphysis of clavicle sustained on 3/08/2021  Since the prior visit, He reports minimal improvement  He stated that he has been working with physical therapy with mild improvement  Pain is improved by rest, ice and physical therapy  Pain is aggravated by overhead activity, reaching back, rotation and lifting   Symptoms include clicking and catching  The patient has weakness  The patient has tried rest, ice, NSAIDS and physical therapy  Shoulder Surgical History:  None    Past Medical, Social and Family History:  Past Medical History:   Diagnosis Date    Anxiety      History reviewed  No pertinent surgical history    Allergies   Allergen Reactions    Pollen Extract Other (See Comments)     Current Outpatient Medications on File Prior to Visit   Medication Sig Dispense Refill    atorvastatin (LIPITOR) 20 mg tablet Take 20 mg by mouth daily      escitalopram (LEXAPRO) 20 mg tablet Take 20 mg by mouth daily      ibuprofen (MOTRIN) 200 mg tablet Take 200 mg by mouth every 6 (six) hours as needed for mild pain      LORazepam (ATIVAN) 1 mg tablet Take 0 5 tablets (0 5 mg total) by mouth once for 1 dose 1 tablet 0    naproxen (NAPROSYN) 500 mg tablet Take 1 tablet (500 mg total) by mouth 2 (two) times a day with meals (Patient not taking: Reported on 4/15/2021) 30 tablet 0     No current facility-administered medications on file prior to visit        Social History     Socioeconomic History    Marital status: Single     Spouse name: Not on file    Number of children: Not on file    Years of education: 12/technical school     Highest education level: Not on file   Occupational History    Occupation:     Social Needs    Financial resource strain: Not on file    Food insecurity     Worry: Not on file     Inability: Not on file   [employer] needs     Medical: Not on file     Non-medical: Not on file   Tobacco Use    Smoking status: Current Every Day Smoker     Packs/day: 0 25    Smokeless tobacco: Never Used   Substance and Sexual Activity    Alcohol use: Yes     Comment: Occasional     Drug use: Never     Comment: Illicit drugs:   none  - As per Donna Morgan Sexual activity: Not on file   Lifestyle    Physical activity     Days per week: Not on file     Minutes per session: Not on file    Stress: Not on file   Relationships    Social connections     Talks on phone: Not on file     Gets together: Not on file     Attends Restorationist service: Not on file     Active member of club or organization: Not on file     Attends meetings of clubs or organizations: Not on file     Relationship status: Not on file    Intimate partner violence     Fear of current or ex partner: Not on file     Emotionally abused: Not on file     Physically abused: Not on file     Forced sexual activity: Not on file   Other Topics Concern    Not on file Social History Narrative    · Do you currently or have you served in [employer]:   No      · Caffeine intake: Moderate      · Guns present in home:   No      · Seat belts used routinely:   Yes      · Sunscreen used routinely:   Yes      · Smoke alarm in home: Yes      · Advance directive:   No     As per Roby Bustillo        I have reviewed the past medical, surgical, social and family history, medications and allergies as documented in the EMR  Review of systems: ROS is negative other than that noted in the HPI  Constitutional: Negative for fatigue and fever  Physical Exam:    Blood pressure 160/93, pulse 98, height 5' 11" (1 803 m), weight 115 kg (254 lb)      General/Constitutional: NAD, well developed, well nourished  HENT: Normocephalic, atraumatic  CV: Intact distal pulses, regular rate  Resp: No respiratory distress or labored breathing  Lymphatic: No lymphadenopathy palpated  Neuro: Alert and Oriented x 3, no focal deficits  Psych: Normal mood, normal affect, normal judgement, normal behavior  Skin: Warm, dry, no rashes, no erythema      Shoulder focused exam:       RIGHT LEFT    Scapula Atrophy Negative Negative     Winging Negative Negative     Protraction Negative Negative    Rotator cuff SS 5/5 5/5     IS 5/5 5/5     SubS 5/5 5/5    ROM     170     ER0 60 60     ER90 90    90     IR90 T6    T6     IRb T6    T6    TTP: AC Negative Negative     Biceps Negative Negative     Coracoid Negative Negative    Special Tests: O'Briens Negative Negative     Velasquez-shear Positive Negative     Cross body Adduction Negative Negative     Speeds  Negative Negative     Eusebio's Negative Negative     Whipple Positive Negative       Neer Negative Negative     Sweeney Negative Negative    Instability: Apprehension & relocation not tested not tested     Load & shift not tested not tested    Other: Crank Negative Negative               UE NV Exam: +2 Radial pulses bilaterally  Sensation intact to light touch C5-T1 bilaterally, Radial/median/ulnar nerve motor intact    Cervical ROM is full without pain, numbness or tingling      Shoulder Imaging    No imaging was performed today      Scribe Attestation    I,:  Omega Rivera am acting as a scribe while in the presence of the attending physician :       I,:  Junior Peña, DO personally performed the services described in this documentation    as scribed in my presence :

## 2021-05-19 ENCOUNTER — OFFICE VISIT (OUTPATIENT)
Dept: PHYSICAL THERAPY | Facility: CLINIC | Age: 41
End: 2021-05-19
Payer: OTHER MISCELLANEOUS

## 2021-05-19 DIAGNOSIS — S42.024D CLOSED NONDISPLACED FRACTURE OF SHAFT OF RIGHT CLAVICLE WITH ROUTINE HEALING, SUBSEQUENT ENCOUNTER: Primary | ICD-10-CM

## 2021-05-19 DIAGNOSIS — S42.024A CLOSED NONDISPLACED FRACTURE OF SHAFT OF RIGHT CLAVICLE, INITIAL ENCOUNTER: ICD-10-CM

## 2021-05-19 PROCEDURE — 97110 THERAPEUTIC EXERCISES: CPT

## 2021-05-19 PROCEDURE — 97140 MANUAL THERAPY 1/> REGIONS: CPT

## 2021-05-19 NOTE — PROGRESS NOTES
Daily Note     Today's date: 2021  Patient name: Papito Martins  : 1980  MRN: [id number]  Referring provider: Callum Johnson DO  Dx:   Encounter Diagnosis     ICD-10-CM    1  Closed nondisplaced fracture of shaft of right clavicle with routine healing, subsequent encounter  S42 024D    2  Closed nondisplaced fracture of shaft of right clavicle, initial encounter  S42 024A          Subjective:  Pt reports he saw MD and and MRI revealed no tear in RTC  MD would like him to continue PT to strengthen until he returns to work   Objective: See treatment diary below    Assessment: Tolerated treatment well  Demo good tolerance with strength exercises, fatigued with shld flex AAROM  Crepitus noted during PROM flex  Plan: Continue per plan of care  Progress treatment as tolerated  Will continue 2x/wk for 2 weeks       Precautions:  Gentle ROM and strengthening      Specialty Daily Treatment Diary       Manuals 21   Visit # 7 8 9 10 11   STM R UT, pec group 4' 4' plus median/radial nerve glides 4' 5' 5'   Gentle PROM R shld 6' 6' 6' 5' 5'                   Warm-up         5' 10 min R shoulder seated 10 min 10 min    Neuro Re-Ed        Scap squeeze 30 x30 with cervical retraction - x30 with cervical retraction 30x   Gripping   -     Pulleys   20 20    AROM flex / abd to 90   10  Scaption 2# 2x10 to 90   SL shld abd     2# 20   RS ABC's     Supine 1x 1#   Ther Ex        Nustep     8'   Ball rolls 30   -      AROM flex elbow 30 x30  With cervical retraction Bicep curl 30 3# Bicep curl 30 3# Bicep curl 30 5#   AROM ER 30 x20 20 20 SL 2# 20   Cane ABD 10 x7 with numbness R fingers      Cane flex 20 x20 20  10    Cane ER 20 x20 20  20   Bent row 30 - 30   3# 30 3# 30 5#   TB row  20 YTB x20 green TB hold 5 TB row and ext  20 ea  GTB TB row and ext  20 ea  GTB TB row and ext  20 ea  Grn tubing     tricep pull down x10, x5 green TB hold 5 ea 20   22 5 #  CC 20   22 5 #  CC 20 22 5 #  CC   Wall slides   20 20    Kneeling lat pulldown     17 5# w bar 20           Ther Activity                                Modalities        CP 5 min 6 min R shoulder seated 5 min

## 2021-05-26 ENCOUNTER — APPOINTMENT (OUTPATIENT)
Dept: PHYSICAL THERAPY | Facility: CLINIC | Age: 41
End: 2021-05-26
Payer: OTHER MISCELLANEOUS

## 2021-07-06 ENCOUNTER — APPOINTMENT (EMERGENCY)
Dept: RADIOLOGY | Facility: HOSPITAL | Age: 41
End: 2021-07-06
Payer: COMMERCIAL

## 2021-07-06 ENCOUNTER — HOSPITAL ENCOUNTER (OUTPATIENT)
Facility: HOSPITAL | Age: 41
Setting detail: OBSERVATION
Discharge: HOME | End: 2021-07-07
Attending: EMERGENCY MEDICINE | Admitting: INTERNAL MEDICINE
Payer: COMMERCIAL

## 2021-07-06 DIAGNOSIS — R06.00 DYSPNEA: ICD-10-CM

## 2021-07-06 DIAGNOSIS — R20.2 PARESTHESIAS: ICD-10-CM

## 2021-07-06 DIAGNOSIS — R42 POSTURAL DIZZINESS WITH PRESYNCOPE: ICD-10-CM

## 2021-07-06 DIAGNOSIS — R55 PRE-SYNCOPE: ICD-10-CM

## 2021-07-06 DIAGNOSIS — R55 POSTURAL DIZZINESS WITH PRESYNCOPE: ICD-10-CM

## 2021-07-06 DIAGNOSIS — R94.31 T WAVE INVERSION IN EKG: Primary | ICD-10-CM

## 2021-07-06 PROBLEM — N17.9 AKI (ACUTE KIDNEY INJURY) (HCC): Status: ACTIVE | Noted: 2021-07-06

## 2021-07-06 PROBLEM — I10 HTN (HYPERTENSION): Status: ACTIVE | Noted: 2021-07-06

## 2021-07-06 LAB
ALBUMIN SERPL BCP-MCNC: 4.8 G/DL (ref 3.4–4.8)
ALP SERPL-CCNC: 107 U/L (ref 10–129)
ALT SERPL W P-5'-P-CCNC: 90 U/L (ref 5–63)
ANION GAP SERPL CALCULATED.3IONS-SCNC: 12 MMOL/L (ref 4–13)
AST SERPL W P-5'-P-CCNC: 38 U/L (ref 15–41)
BASOPHILS # BLD AUTO: 0.06 THOUSANDS/ΜL (ref 0–0.1)
BASOPHILS NFR BLD AUTO: 1 % (ref 0–1)
BILIRUB DIRECT SERPL-MCNC: 0.11 MG/DL (ref 0–0.3)
BILIRUB SERPL-MCNC: 0.62 MG/DL (ref 0.3–1.2)
BUN SERPL-MCNC: 17 MG/DL (ref 6–20)
CALCIUM SERPL-MCNC: 9.7 MG/DL (ref 8.4–10.2)
CHLORIDE SERPL-SCNC: 100 MMOL/L (ref 96–108)
CO2 SERPL-SCNC: 25 MMOL/L (ref 22–33)
CREAT SERPL-MCNC: 1.42 MG/DL (ref 0.5–1.2)
D DIMER PPP FEU-MCNC: 0.27 MG/L FEU (ref 0.19–0.49)
EOSINOPHIL # BLD AUTO: 0.12 THOUSAND/ΜL (ref 0–0.61)
EOSINOPHIL NFR BLD AUTO: 2 % (ref 0–6)
ERYTHROCYTE [DISTWIDTH] IN BLOOD BY AUTOMATED COUNT: 13 % (ref 11.6–15.1)
GFR SERPL CREATININE-BSD FRML MDRD: 62 ML/MIN/1.73SQ M
GLUCOSE SERPL-MCNC: 97 MG/DL (ref 65–140)
HCT VFR BLD AUTO: 40.6 % (ref 36.5–49.3)
HGB BLD-MCNC: 14.6 G/DL (ref 12–17)
IMM GRANULOCYTES # BLD AUTO: 0.02 THOUSAND/UL (ref 0–0.2)
IMM GRANULOCYTES NFR BLD AUTO: 0 % (ref 0–2)
LYMPHOCYTES # BLD AUTO: 2.33 THOUSANDS/ΜL (ref 0.6–4.47)
LYMPHOCYTES NFR BLD AUTO: 28 % (ref 14–44)
MCH RBC QN AUTO: 29.4 PG (ref 26.8–34.3)
MCHC RBC AUTO-ENTMCNC: 36 G/DL (ref 31.4–37.4)
MCV RBC AUTO: 82 FL (ref 82–98)
MONOCYTES # BLD AUTO: 0.67 THOUSAND/ΜL (ref 0.17–1.22)
MONOCYTES NFR BLD AUTO: 8 % (ref 4–12)
NEUTROPHILS # BLD AUTO: 5.01 THOUSANDS/ΜL (ref 1.85–7.62)
NEUTS SEG NFR BLD AUTO: 61 % (ref 43–75)
PLATELET # BLD AUTO: 325 THOUSANDS/UL (ref 149–390)
PMV BLD AUTO: 9.2 FL (ref 8.9–12.7)
POTASSIUM SERPL-SCNC: 3.6 MMOL/L (ref 3.5–5)
PROT SERPL-MCNC: 7.3 G/DL (ref 6.4–8.3)
RBC # BLD AUTO: 4.96 MILLION/UL (ref 3.88–5.62)
SODIUM SERPL-SCNC: 137 MMOL/L (ref 133–145)
TROPONIN I SERPL-MCNC: <0.03 NG/ML (ref 0–0.07)
TROPONIN I SERPL-MCNC: <0.03 NG/ML (ref 0–0.07)
WBC # BLD AUTO: 8.21 THOUSAND/UL (ref 4.31–10.16)

## 2021-07-06 PROCEDURE — 84484 ASSAY OF TROPONIN QUANT: CPT | Performed by: INTERNAL MEDICINE

## 2021-07-06 PROCEDURE — 71045 X-RAY EXAM CHEST 1 VIEW: CPT

## 2021-07-06 PROCEDURE — 99285 EMERGENCY DEPT VISIT HI MDM: CPT | Performed by: EMERGENCY MEDICINE

## 2021-07-06 PROCEDURE — 84484 ASSAY OF TROPONIN QUANT: CPT | Performed by: EMERGENCY MEDICINE

## 2021-07-06 PROCEDURE — 80076 HEPATIC FUNCTION PANEL: CPT | Performed by: EMERGENCY MEDICINE

## 2021-07-06 PROCEDURE — 85025 COMPLETE CBC W/AUTO DIFF WBC: CPT | Performed by: EMERGENCY MEDICINE

## 2021-07-06 PROCEDURE — 99220 PR INITIAL OBSERVATION CARE/DAY 70 MINUTES: CPT | Performed by: INTERNAL MEDICINE

## 2021-07-06 PROCEDURE — 85379 FIBRIN DEGRADATION QUANT: CPT | Performed by: EMERGENCY MEDICINE

## 2021-07-06 PROCEDURE — 80048 BASIC METABOLIC PNL TOTAL CA: CPT | Performed by: EMERGENCY MEDICINE

## 2021-07-06 PROCEDURE — 93005 ELECTROCARDIOGRAM TRACING: CPT

## 2021-07-06 PROCEDURE — 99285 EMERGENCY DEPT VISIT HI MDM: CPT

## 2021-07-06 PROCEDURE — 36415 COLL VENOUS BLD VENIPUNCTURE: CPT | Performed by: EMERGENCY MEDICINE

## 2021-07-06 PROCEDURE — 96360 HYDRATION IV INFUSION INIT: CPT

## 2021-07-06 RX ORDER — ASPIRIN 81 MG/1
81 TABLET, CHEWABLE ORAL DAILY
Status: DISCONTINUED | OUTPATIENT
Start: 2021-07-07 | End: 2021-07-07 | Stop reason: HOSPADM

## 2021-07-06 RX ORDER — AMLODIPINE BESYLATE 5 MG/1
5 TABLET ORAL DAILY
Status: DISCONTINUED | OUTPATIENT
Start: 2021-07-07 | End: 2021-07-07 | Stop reason: HOSPADM

## 2021-07-06 RX ORDER — ATORVASTATIN CALCIUM 20 MG/1
20 TABLET, FILM COATED ORAL DAILY
Status: DISCONTINUED | OUTPATIENT
Start: 2021-07-07 | End: 2021-07-07 | Stop reason: HOSPADM

## 2021-07-06 RX ORDER — ONDANSETRON 2 MG/ML
4 INJECTION INTRAMUSCULAR; INTRAVENOUS EVERY 6 HOURS PRN
Status: DISCONTINUED | OUTPATIENT
Start: 2021-07-06 | End: 2021-07-07 | Stop reason: HOSPADM

## 2021-07-06 RX ORDER — NITROGLYCERIN 0.4 MG/1
0.4 TABLET SUBLINGUAL
Status: DISCONTINUED | OUTPATIENT
Start: 2021-07-06 | End: 2021-07-07 | Stop reason: HOSPADM

## 2021-07-06 RX ORDER — AMLODIPINE BESYLATE 2.5 MG/1
2.5 TABLET ORAL DAILY
COMMUNITY

## 2021-07-06 RX ORDER — ACETAMINOPHEN 325 MG/1
650 TABLET ORAL EVERY 6 HOURS PRN
Status: DISCONTINUED | OUTPATIENT
Start: 2021-07-06 | End: 2021-07-07 | Stop reason: HOSPADM

## 2021-07-06 RX ORDER — DOCUSATE SODIUM 100 MG/1
100 CAPSULE, LIQUID FILLED ORAL 2 TIMES DAILY
Status: DISCONTINUED | OUTPATIENT
Start: 2021-07-06 | End: 2021-07-07 | Stop reason: HOSPADM

## 2021-07-06 RX ORDER — ESCITALOPRAM OXALATE 10 MG/1
20 TABLET ORAL DAILY
Status: DISCONTINUED | OUTPATIENT
Start: 2021-07-07 | End: 2021-07-07 | Stop reason: HOSPADM

## 2021-07-06 RX ORDER — SODIUM CHLORIDE 9 MG/ML
3 INJECTION INTRAVENOUS
Status: DISCONTINUED | OUTPATIENT
Start: 2021-07-06 | End: 2021-07-07 | Stop reason: HOSPADM

## 2021-07-06 RX ORDER — SODIUM CHLORIDE, SODIUM LACTATE, POTASSIUM CHLORIDE, CALCIUM CHLORIDE 600; 310; 30; 20 MG/100ML; MG/100ML; MG/100ML; MG/100ML
75 INJECTION, SOLUTION INTRAVENOUS CONTINUOUS
Status: DISCONTINUED | OUTPATIENT
Start: 2021-07-06 | End: 2021-07-07 | Stop reason: HOSPADM

## 2021-07-06 RX ADMIN — SODIUM CHLORIDE, SODIUM LACTATE, POTASSIUM CHLORIDE, AND CALCIUM CHLORIDE 75 ML/HR: .6; .31; .03; .02 INJECTION, SOLUTION INTRAVENOUS at 19:47

## 2021-07-06 RX ADMIN — SODIUM CHLORIDE 1000 ML: 0.9 INJECTION, SOLUTION INTRAVENOUS at 16:31

## 2021-07-06 NOTE — LETTER
[address] 3RD  FLOOR MED SURG UNIT  [address] [facility] [place] [address]  Dept: [phone]    July 7, 2021     Patient: Lor Gracia   YOB: 1980   Date of Visit: 7/6/2021       To Whom it May Concern:    Yeimy Monreal is under my professional care  He was seen in the hospital from 7/6/2021   to 07/07/21  He may return to school on 7-9-21 without limitations  If you have any questions or concerns, please don't hesitate to call           Sincerely,          Yani Connor MD

## 2021-07-06 NOTE — H&P
[address] [place] [address] 1980, 45 y o  male MRN: [id number]  Unit/Bed#: -01 Encounter: [id number]  Primary Care Provider: Myra Lefort, MD   Date and time admitted to hospital: 7/6/2021  3:25 PM    * T wave inversion in EKG  Assessment & Plan  Patient has dizziness and shortness of breath, denies of any chest pain  No prior EKGs to compare  Likely patient has longstanding hypertension which is untreated  The T-wave inversion noted in V5 and V6 and lead AVF and Will check 2D echocardiogram and trend troponin and also consult Cardiology  HAYDEN (acute kidney injury) ([facility] [address] )  Assessment & Plan  Will give IV fluids , hold hyzaar       HTN (hypertension)  Assessment & Plan  Will hold Hyzaar due to HAYDEN and will resume Norvasc and monitor blood pressure  Postural dizziness with presyncope  Assessment & Plan  Patient was recently started on 2 antihypertensives-blood pressure is better in 120s currently  Patient also noted to have acute kidney injury with dehydration-will give IV hydration  And monitor blood pressure closely  VTE Pharmacologic Prophylaxis:   Low Risk (Score 0-2) - Encourage Ambulation  Code Status: Level 1 - Full Code   Discussion with family: Updated  (wife) at bedside  Anticipated Length of Stay: Patient will be admitted on an observation basis with an anticipated length of stay of less than 2 midnights secondary to dizziness, abnormal ekg   Total Time for Visit, including Counseling / Coordination of Care: 45 minutes Greater than 50% of this total time spent on direct patient counseling and coordination of care  Chief Complaint: dizziness    History of Present Illness:  Sol Phillip is a 45 y o  male with a PMH of HTN  of who presents with   Complaints of dizziness and tiredness since the past 3 days    Patient stated that he was recently diagnosed uncontrolled blood pressure and 2 weeks ago he was started on Hyzaar 100 mg/12 5 mg and he followed up a week later and his blood pressure was still elevated will go to 170s to 190/108 and a he was recommended to take Norvasc 5 mg daily  Patient stated that since then he is feeling tired and lethargic and today he went to work and he could not breathe and 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  Patient stated that he could not finish talking and he sat down  Patient denies of any chest pain  Patient denies of any headache or blurry vision  Patient however felt dizzy and lightheaded but no loss of consciousness  The patient usually is tachycardic around 90s heart rate but today he was around 120s  EKG was obtained in the ED which showed generalized T-wave inversion  No prior EKGs to compare  Cardiology was contacted from the ED and recommended admit in [facility]  Review of Systems:  Review of Systems   Constitutional: Positive for fatigue  HENT: Negative  Eyes: Negative  Respiratory: Positive for shortness of breath  Cardiovascular: Negative  Gastrointestinal: Negative  Endocrine: Negative  Genitourinary: Negative  Musculoskeletal: Negative  Skin: Negative  Allergic/Immunologic: Negative  Neurological: Positive for dizziness  Hematological: Negative  Psychiatric/Behavioral: Negative  Past Medical and Surgical History:   Past Medical History:   Diagnosis Date    Anxiety     Hypercholesteremia        History reviewed  No pertinent surgical history  Meds/Allergies:  Prior to Admission medications    Medication Sig Start Date End Date Taking?  Authorizing Provider   amLODIPine (NORVASC) 2 5 mg tablet Take 2 5 mg by mouth daily Pt unsure of dose at this time lowest dose used   Yes Historical Provider, MD   atorvastatin (LIPITOR) 20 mg tablet Take 20 mg by mouth daily   Yes Historical Provider, MD   escitalopram (LEXAPRO) 20 mg tablet Take 20 mg by mouth daily   Yes Historical Provider, MD ibuprofen (MOTRIN) 200 mg tablet Take 200 mg by mouth every 6 (six) hours as needed for mild pain  7/6/21  Historical Provider, MD   LORazepam (ATIVAN) 1 mg tablet Take 0 5 tablets (0 5 mg total) by mouth once for 1 dose 4/15/21 7/6/21  Colton Huynh DO   naproxen (NAPROSYN) 500 mg tablet Take 1 tablet (500 mg total) by mouth 2 (two) times a day with meals  Patient not taking: Reported on 4/15/2021 3/8/21 7/6/21  Melia Arzola PA-C     I have reviewed home medications with patient personally  Allergies:    Allergies   Allergen Reactions    Pollen Extract Other (See Comments)       Social History:  Marital Status: Single   Occupation: paint job   Patient Pre-hospital Living Situation: Home  Patient Pre-hospital Level of Mobility: walks  Patient Pre-hospital Diet Restrictions: nil  Substance Use History:   Social History     Substance and Sexual Activity   Alcohol Use Yes    Comment: Occasional      Social History     Tobacco Use   Smoking Status Former Smoker    Packs/day: 0 25   Smokeless Tobacco Never Used     Social History     Substance and Sexual Activity   Drug Use Never    Comment: Illicit drugs:   none  - As per [place]        Family History:  Family History   Problem Relation Age of Onset    No Known Problems Mother     No Known Problems Father        Physical Exam:     Vitals:   Blood Pressure: 128/71 (07/06/21 1828)  Pulse: 95 (07/06/21 1828)  Temperature: (!) 97 3 °F (36 3 °C) (07/06/21 1828)  Temp Source: Tympanic (07/06/21 1828)  Respirations: 18 (07/06/21 1828)  Height: 5' 10" (177 8 cm) (07/06/21 1526)  Weight - Scale: 111 kg (245 lb) (07/06/21 1526)  SpO2: 99 % (07/06/21 1828)    Physical Exam   HEENT-PERRLA, moist oral mucosa  Neck-supple, no JVD elevation   Respiratory-equal air entry bilaterally, no rales or rhonchi  Cardiovascular system-S1, S2 heard, no murmur or gallops or rubs  Abdomen-soft, nontender, no guarding or rigidity, bowel sounds heard  Extremities-no pedal edema  Peripheral pulses palpable  Musculoskeletal-no contractures  Central nervous system-no acute focal neurological deficit ,no sensory or motor deficit noted  Skin-no rash noted        Additional Data:     Lab Results:  Results from last 7 days   Lab Units 07/06/21  1554   WBC Thousand/uL 8 21   HEMOGLOBIN g/dL 14 6   HEMATOCRIT % 40 6   PLATELETS Thousands/uL 325   NEUTROS PCT % 61   LYMPHS PCT % 28   MONOS PCT % 8   EOS PCT % 2     Results from last 7 days   Lab Units 07/06/21  1601 07/06/21  1554   SODIUM mmol/L  --  137   POTASSIUM mmol/L  --  3 6   CHLORIDE mmol/L  --  100   CO2 mmol/L  --  25   BUN mg/dL  --  17   CREATININE mg/dL  --  1 42*   ANION GAP mmol/L  --  12   CALCIUM mg/dL  --  9 7   ALBUMIN g/dL 4 8  --    TOTAL BILIRUBIN mg/dL 0 62  --    ALK PHOS U/L 107 0  --    ALT U/L 90*  --    AST U/L 38  --    GLUCOSE RANDOM mg/dL  --  97                       Imaging: Reviewed radiology reports from this admission including: chest xray chest x-ray was reviewed shows no acute infiltrate  X-ray chest 1 view portable    (Results Pending)       EKG and Other Studies Reviewed on Admission:   · EKG: NSR  HR T-wave inversions noted in lead AVF and V5 and V6 and hyper polarisation changes noted in V2  and V 3       ** Please Note: This note has been constructed using a voice recognition system   **

## 2021-07-06 NOTE — ASSESSMENT & PLAN NOTE
Patient was recently started on 2 antihypertensives-blood pressure is better in 120s currently  Patient also noted to have acute kidney injury with dehydration-will give IV hydration  And monitor blood pressure closely

## 2021-07-06 NOTE — ED PROVIDER NOTES
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  It has been very warm outside  Patient noted feeling globally weak  No focal weakness  Patient felt like he needed to lower himself to the ground as he felt lightheaded  Patient did not pass out  Did not fall the ground  Patient also noted shortness of breath  No chest pain  No history of ACS, arrhythmia, DVT or PE, no recent hospitalization or travel, denies lower extremity edema, denies hemoptysis  Denies neurological deficits such as change in vision, hearing, focal weakness, numbness  Patient is hydrating adequately  Shortness of Breath  Severity:  Moderate  Onset quality:  Sudden  Timing:  Constant  Progression:  Unchanged  Chronicity:  New  Relieved by:  Nothing  Worsened by:  Nothing  Ineffective treatments:  None tried  Dizziness  Associated symptoms: shortness of breath    Fatigue  Associated symptoms: dizziness (lightheaded) and shortness of breath        Prior to Admission Medications   Prescriptions Last Dose Informant Patient Reported? Taking?    amLODIPine (NORVASC) 2 5 mg tablet  Self Yes Yes   Sig: Take 2 5 mg by mouth daily Pt unsure of dose at this time lowest dose used   atorvastatin (LIPITOR) 20 mg tablet 7/6/2021 at Unknown time Self Yes Yes   Sig: Take 20 mg by mouth daily   escitalopram (LEXAPRO) 20 mg tablet 7/6/2021 at Unknown time Self Yes Yes   Sig: Take 20 mg by mouth daily      Facility-Administered Medications: None       Past Medical History:   Diagnosis Date    Anxiety     Hypercholesteremia        History reviewed  No pertinent surgical history  Family History   Problem Relation Age of Onset    No Known Problems Mother     No Known Problems Father      I have reviewed and agree with the history as documented  E-Cigarette/Vaping    E-Cigarette Use Never User      E-Cigarette/Vaping Substances     Social History     Tobacco Use    Smoking status: Former Smoker     Packs/day: 0 25    Smokeless tobacco: Never Used   Vaping Use    Vaping Use: Never used   Substance Use Topics    Alcohol use: Yes     Comment: Occasional     Drug use: Never     Comment: Illicit drugs:   none  - As per Anny        Review of Systems   Constitutional: Positive for fatigue  Respiratory: Positive for shortness of breath  Neurological: Positive for dizziness (lightheaded)  All other systems reviewed and are negative  Physical Exam  Physical Exam  Vitals and nursing note reviewed  Constitutional:       General: He is not in acute distress  Appearance: He is well-developed  He is not diaphoretic  HENT:      Head: Normocephalic and atraumatic  Right Ear: External ear normal       Left Ear: External ear normal    Eyes:      Conjunctiva/sclera: Conjunctivae normal    Neck:      Vascular: No JVD  Trachea: No tracheal deviation  Cardiovascular:      Rate and Rhythm: Regular rhythm  Tachycardia present  Heart sounds: Normal heart sounds  No murmur heard  Pulmonary:      Effort: No respiratory distress  Breath sounds: Normal breath sounds  No stridor  No wheezing or rales  Abdominal:      General: Bowel sounds are normal  There is no distension  Palpations: Abdomen is soft  There is no mass  Tenderness: There is no abdominal tenderness  There is no guarding or rebound     Genitourinary:     Comments: Deferred  Musculoskeletal: General: No tenderness or deformity  Skin:     General: Skin is warm and dry  Capillary Refill: Capillary refill takes less than 2 seconds  Coloration: Skin is not pale  Findings: No erythema or rash  Neurological:      General: No focal deficit present  Mental Status: He is oriented to person, place, and time  Cranial Nerves: No cranial nerve deficit  Motor: No weakness or abnormal muscle tone  Coordination: Coordination normal       Comments: Cranial nerves 2-12 intact bilaterally  Muscular strength 5/5 in upper lower extremities bilaterally  Sensation intact light touch in upper lower extremities bilaterally  Negative pronator drift  Normal point-to-point   Psychiatric:         Behavior: Behavior normal          Thought Content:  Thought content normal          Judgment: Judgment normal          Vital Signs  ED Triage Vitals   Temperature Pulse Respirations Blood Pressure SpO2   07/06/21 1526 07/06/21 1526 07/06/21 1526 07/06/21 1526 07/06/21 1526   97 6 °F (36 4 °C) (!) 121 20 117/80 100 %      Temp Source Heart Rate Source Patient Position - Orthostatic VS BP Location FiO2 (%)   07/06/21 1526 07/06/21 1526 07/06/21 1526 07/06/21 1526 --   Oral Monitor Sitting Right arm       Pain Score       07/06/21 2024       No Pain           Vitals:    07/06/21 1526 07/06/21 1632 07/06/21 1733 07/06/21 1828   BP: 117/80 116/76 130/74 128/71   Pulse: (!) 121 (!) 106 98 95   Patient Position - Orthostatic VS: Sitting   Lying         Visual Acuity      ED Medications  Medications   sodium chloride (PF) 0 9 % injection 3 mL (has no administration in time range)   atorvastatin (LIPITOR) tablet 20 mg (has no administration in time range)   amLODIPine (NORVASC) tablet 5 mg (has no administration in time range)   escitalopram (LEXAPRO) tablet 20 mg (has no administration in time range)   docusate sodium (COLACE) capsule 100 mg (has no administration in time range)   acetaminophen (TYLENOL) tablet 650 mg (has no administration in time range)   ondansetron (ZOFRAN) injection 4 mg (has no administration in time range)   aspirin chewable tablet 81 mg (has no administration in time range)   nitroglycerin (NITROSTAT) SL tablet 0 4 mg (has no administration in time range)   enoxaparin (LOVENOX) subcutaneous injection 40 mg (has no administration in time range)   lactated ringers infusion (75 mL/hr Intravenous New Bag 7/6/21 1947)   sodium chloride 0 9 % bolus 1,000 mL (1,000 mL Intravenous New Bag 7/6/21 1631)       Diagnostic Studies  Results Reviewed     Procedure Component Value Units Date/Time    Troponin I [[id number]]  (Normal) Collected: 07/06/21 1554    Lab Status: Final result Specimen: Blood from Arm, Left Updated: 07/06/21 1626     Troponin I <0 03 ng/mL     Hepatic function panel [[id number]]  (Abnormal) Collected: 07/06/21 1601    Lab Status: Final result Specimen: Blood from Arm, Left Updated: 07/06/21 1623     Total Bilirubin 0 62 mg/dL      Bilirubin, Direct 0 11 mg/dL      Alkaline Phosphatase 107 0 U/L      AST 38 U/L      ALT 90 U/L      Total Protein 7 3 g/dL      Albumin 4 8 g/dL     Basic metabolic panel [[id number]]  (Abnormal) Collected: 07/06/21 1554    Lab Status: Final result Specimen: Blood from Arm, Left Updated: 07/06/21 1623     Sodium 137 mmol/L      Potassium 3 6 mmol/L      Chloride 100 mmol/L      CO2 25 mmol/L      ANION GAP 12 mmol/L      BUN 17 mg/dL      Creatinine 1 42 mg/dL      Glucose 97 mg/dL      Calcium 9 7 mg/dL      eGFR 62 ml/min/1 73sq m     Narrative:      [place] guidelines for Chronic Kidney Disease (CKD):     Stage 1 with normal or high GFR (GFR > 90 mL/min/1 73 square meters)    Stage 2 Mild CKD (GFR = 60-89 mL/min/1 73 square meters)    Stage 3A Moderate CKD (GFR = 45-59 mL/min/1 73 square meters)    Stage 3B Moderate CKD (GFR = 30-44 mL/min/1 73 square meters)    Stage 4 Severe CKD (GFR = 15-29 mL/min/1 73 square meters)   Stage 5 End Stage CKD (GFR <15 mL/min/1 73 square meters)  Note: GFR calculation is accurate only with a steady state creatinine    D-dimer, quantitative [[id number]]  (Normal) Collected: 07/06/21 1601    Lab Status: Final result Specimen: Blood from Arm, Left Updated: 07/06/21 1617     D-Dimer, Quant  0 27 mg/L FEU     CBC and differential [[id number]]  (Normal) Collected: 07/06/21 1554    Lab Status: Final result Specimen: Blood from Arm, Left Updated: 07/06/21 1607     WBC 8 21 Thousand/uL      RBC 4 96 Million/uL      Hemoglobin 14 6 g/dL      Hematocrit 40 6 %      MCV 82 fL      MCH 29 4 pg      MCHC 36 0 g/dL      RDW 13 0 %      MPV 9 2 fL      Platelets 862 Thousands/uL      Neutrophils Relative 61 %      Immat GRANS % 0 %      Lymphocytes Relative 28 %      Monocytes Relative 8 %      Eosinophils Relative 2 %      Basophils Relative 1 %      Neutrophils Absolute 5 01 Thousands/µL      Immature Grans Absolute 0 02 Thousand/uL      Lymphocytes Absolute 2 33 Thousands/µL      Monocytes Absolute 0 67 Thousand/µL      Eosinophils Absolute 0 12 Thousand/µL      Basophils Absolute 0 06 Thousands/µL                  X-ray chest 1 view portable    (Results Pending)              Procedures  Procedures         ED Course  ED Course as of Jul 07 0005   Tue Jul 06, 2021   1639 D-Dimer, Quant: 0 27   1643 Creatinine(!): 1 42   1706 Spoke with on-call cardiologist   Recommended admission to the hospital       1707 ECG as dictated by me sinus tachycardia  Ventricular rate 109 beats per minute, normal axis, T-wave inversions located in inferior leads as well as lateral precordium  ST elevation in V3 does not meet criteria  ST elevation Approximately 1 mm tall    Q-wave in lead 3  SBIRT 22yo+      Most Recent Value   SBIRT (24 yo +)   In order to provide better care to our patients, we are screening all of our patients for alcohol and drug use   Would it be okay to ask you these screening questions? No Filed at: 07/06/2021 1540   Initial Alcohol Screen: US AUDIT-C    1  How often do you have a drink containing alcohol?  0 Filed at: 07/06/2021 1540   3a  Male UNDER 65: How often do you have five or more drinks on one occasion? 0 Filed at: 07/06/2021 1540   3b  FEMALE Any Age, or MALE 65+: How often do you have 4 or more drinks on one occassion? 0 Filed at: 07/06/2021 1540   Audit-C Score  0 Filed at: 07/06/2021 1540   PADMA: How many times in the past year have you    Used an illegal drug or used a prescription medication for non-medical reasons? Never Filed at: 07/06/2021 1540          Wells' Criteria for PE      Most Recent Value   Wells' Criteria for PE   Clinical signs and symptoms of DVT  0 Filed at: 07/06/2021 1550   PE is primary diagnosis or equally likely  0 Filed at: 07/06/2021 1550   HR >100  1 5 Filed at: 07/06/2021 1550   Immobilization at least 3 days or Surgery in the previous 4 weeks  0 Filed at: 07/06/2021 1550   Previous, objectively diagnosed PE or DVT  0 Filed at: 07/06/2021 1550   Hemoptysis  0 Filed at: 07/06/2021 1550   Malignancy with treatment within 6 months or palliative  0 Filed at: 07/06/2021 1550   Wells' Criteria Total  1 5 Filed at: 07/06/2021 1550                MDM  Number of Diagnoses or Management Options  Dyspnea: new and requires workup  Paresthesias: new and requires workup  Pre-syncope: new and requires workup  T wave inversion in EKG: new and requires workup  Diagnosis management comments: 67-year-old male presents with paresthesias, dyspnea, lightheadedness, presyncope  Will evaluate for arrhythmia, electrolyte abnormalities, ACS with the some likely, pulmonary embolism  No pain with this to suggest dissection  No pulse deficits  No signs of stroke on his examination    D-dimer within normal limits  No suspicion for pulmonary embolism  Spoke to Cardiology    They recommended admission to the hospital   Patient admitted to Medicine           Amount and/or Complexity of Data Reviewed  Clinical lab tests: ordered and reviewed  Tests in the radiology section of CPT®: ordered and reviewed  Review and summarize past medical records: yes  Discuss the patient with other providers: yes  Independent visualization of images, tracings, or specimens: yes    Risk of Complications, Morbidity, and/or Mortality  Presenting problems: moderate  Diagnostic procedures: moderate  Management options: moderate        Disposition  Final diagnoses:   T wave inversion in EKG   Pre-syncope   Paresthesias   Dyspnea     Time reflects when diagnosis was documented in both MDM as applicable and the Disposition within this note     Time User Action Codes Description Comment    7/6/2021  5:07 PM Robe Fei Add [R94 31] T wave inversion in EKG     7/6/2021  5:09 PM Robe Fei Add [R55] Pre-syncope     7/6/2021  5:09 PM Robe Fei Add [R20 2] Paresthesias     7/6/2021  5:09 PM Robe Fei Add [R06 00] Dyspnea       ED Disposition     ED Disposition Condition Date/Time Comment    Admit Stable Tue Jul 6, 2021  5:07 PM Case was discussed with chriss and the patient's admission status was agreed to be Admission Status: observation status to the service of Dr Ron Labor   Follow-up Information    None         Current Discharge Medication List      CONTINUE these medications which have NOT CHANGED    Details   amLODIPine (NORVASC) 2 5 mg tablet Take 2 5 mg by mouth daily Pt unsure of dose at this time lowest dose used      atorvastatin (LIPITOR) 20 mg tablet Take 20 mg by mouth daily      escitalopram (LEXAPRO) 20 mg tablet Take 20 mg by mouth daily           No discharge procedures on file      PDMP Review       Value Time User    PDMP Reviewed  Yes 4/15/2021 10:44 AM Norma Mejia DO          ED Provider  Electronically Signed by           Tyler Lara DO  07/07/21 0005

## 2021-07-06 NOTE — ASSESSMENT & PLAN NOTE
Patient has dizziness and shortness of breath, denies of any chest pain  No prior EKGs to compare  Likely patient has longstanding hypertension which is untreated  The T-wave inversion noted in V5 and V6 and lead AVF and Will check 2D echocardiogram and trend troponin and also consult Cardiology

## 2021-07-07 VITALS
HEIGHT: 70 IN | DIASTOLIC BLOOD PRESSURE: 75 MMHG | TEMPERATURE: 98.1 F | SYSTOLIC BLOOD PRESSURE: 130 MMHG | HEART RATE: 91 BPM | RESPIRATION RATE: 19 BRPM | BODY MASS INDEX: 35.07 KG/M2 | WEIGHT: 245 LBS | OXYGEN SATURATION: 99 %

## 2021-07-07 LAB
ANION GAP SERPL CALCULATED.3IONS-SCNC: 10 MMOL/L (ref 4–13)
BUN SERPL-MCNC: 18 MG/DL (ref 6–20)
CALCIUM SERPL-MCNC: 8.8 MG/DL (ref 8.4–10.2)
CHLORIDE SERPL-SCNC: 104 MMOL/L (ref 96–108)
CO2 SERPL-SCNC: 24 MMOL/L (ref 22–33)
CREAT SERPL-MCNC: 0.89 MG/DL (ref 0.5–1.2)
ERYTHROCYTE [DISTWIDTH] IN BLOOD BY AUTOMATED COUNT: 13.2 % (ref 11.6–15.1)
GFR SERPL CREATININE-BSD FRML MDRD: 108 ML/MIN/1.73SQ M
GLUCOSE P FAST SERPL-MCNC: 134 MG/DL (ref 70–105)
GLUCOSE SERPL-MCNC: 134 MG/DL (ref 65–140)
HCT VFR BLD AUTO: 38 % (ref 36.5–49.3)
HGB BLD-MCNC: 13.4 G/DL (ref 12–17)
MCH RBC QN AUTO: 29.8 PG (ref 26.8–34.3)
MCHC RBC AUTO-ENTMCNC: 35.3 G/DL (ref 31.4–37.4)
MCV RBC AUTO: 84 FL (ref 82–98)
PLATELET # BLD AUTO: 261 THOUSANDS/UL (ref 149–390)
PMV BLD AUTO: 8.9 FL (ref 8.9–12.7)
POTASSIUM SERPL-SCNC: 3.8 MMOL/L (ref 3.5–5)
RBC # BLD AUTO: 4.5 MILLION/UL (ref 3.88–5.62)
SODIUM SERPL-SCNC: 138 MMOL/L (ref 133–145)
TROPONIN I SERPL-MCNC: <0.03 NG/ML (ref 0–0.07)
WBC # BLD AUTO: 5.25 THOUSAND/UL (ref 4.31–10.16)

## 2021-07-07 PROCEDURE — 80048 BASIC METABOLIC PNL TOTAL CA: CPT | Performed by: INTERNAL MEDICINE

## 2021-07-07 PROCEDURE — 85027 COMPLETE CBC AUTOMATED: CPT | Performed by: INTERNAL MEDICINE

## 2021-07-07 PROCEDURE — 93005 ELECTROCARDIOGRAM TRACING: CPT

## 2021-07-07 PROCEDURE — 99244 OFF/OP CNSLTJ NEW/EST MOD 40: CPT | Performed by: INTERNAL MEDICINE

## 2021-07-07 PROCEDURE — 99217 PR OBSERVATION CARE DISCHARGE MANAGEMENT: CPT | Performed by: INTERNAL MEDICINE

## 2021-07-07 RX ADMIN — ASPIRIN 81 MG CHEWABLE TABLET 81 MG: 81 TABLET CHEWABLE at 09:04

## 2021-07-07 RX ADMIN — ATORVASTATIN CALCIUM 20 MG: 20 TABLET, FILM COATED ORAL at 09:04

## 2021-07-07 RX ADMIN — AMLODIPINE BESYLATE 5 MG: 5 TABLET ORAL at 09:04

## 2021-07-07 NOTE — UTILIZATION REVIEW
Initial Clinical Review    Admission: Date/Time/Statement:   Admission Orders (From admission, onward)     Ordered        07/06/21 1709  Place in Observation  Once                   Orders Placed This Encounter   Procedures    Place in Observation     Standing Status:   Standing     Number of Occurrences:   1     Order Specific Question:   Level of Care     Answer:   Med Surg [16]     ED Arrival Information     Expected Arrival Acuity    - 7/6/2021 15:19 Urgent         Means of arrival Escorted by Service Admission type    Walk-In Self General Medicine Urgent         Arrival complaint    sob & dizziness        Chief Complaint   Patient presents with    Shortness of Breath     pt presents to ed via walk in with increase SOB that started today states he was started on BP meds two weeks ago and has not been feeling right since that pt very anxious        Initial Presentation: 44 yo male to ED from home admitted observation d/t  * T wave inversion in EKG  Assessment & Plan  Patient has dizziness and shortness of breath, denies of any chest pain  No prior EKGs to compare  Likely patient has longstanding hypertension which is untreated  The T-wave inversion noted in V5 and V6 and lead AVF and Will check 2D echocardiogram and trend troponin and also consult Cardiology      HAYDEN (acute kidney injury) ([address] )  Assessment & Plan  Will give IV fluids , hold hyzaar         HTN (hypertension)  Assessment & Plan  Will hold Hyzaar due to HAYDEN and will resume Norvasc and monitor blood pressure      Postural dizziness with presyncope  Assessment & Plan  Patient was recently started on 2 antihypertensives-blood pressure is better in 120s currently  Patient also noted to have acute kidney injury with dehydration-will give IV hydration  And monitor blood pressure closely      7/7 cardiology consult:Abnormal ECG: nonspecific T wave changes seen, likely due to recent uncontrolled HTN  Will review echocardiogram, once available  agree with resuming amlodipine and then add back Hyzaar only if required by BP at a later time- goal BP in 130's/80/s  Postural dizziness without syncope, likely due to dehydration  HAYDEN likely due to volume depletion  IVF  If echo without significant structural heart disease, patient can be discharged with further follow up as outpatient for BP management          ED Triage Vitals   Temperature Pulse Respirations Blood Pressure SpO2   07/06/21 1526 07/06/21 1526 07/06/21 1526 07/06/21 1526 07/06/21 1526   97 6 °F (36 4 °C) (!) 121 20 117/80 100 %      Temp Source Heart Rate Source Patient Position - Orthostatic VS BP Location FiO2 (%)   07/06/21 1526 07/06/21 1526 07/06/21 1526 07/06/21 1526 --   Oral Monitor Sitting Right arm       Pain Score       07/06/21 2024       No Pain          Wt Readings from Last 1 Encounters:   07/06/21 111 kg (245 lb)     Additional Vital Signs:   07/07/21 0829  96 9 °F (36 1 °C)Abnormal   84  18  131/86  103  98 %  None (Room air)  Lying   07/07/21 0604  --  87  --  151/93  --  --  --  --   07/06/21 2300  97 7 °F (36 5 °C)  103  18  137/75  --  97 %  None (Room air)  Lying   07/06/21 1828  97 3 °F (36 3 °C)Abnormal   95  18  128/71  --  99 %  None (Room air)  Lying   07/06/21 1733  --  98  18  130/74  --  98 %  None (Room air)  --   07/06/21 1632  --  106Abnormal   18  116/76  --  95 %  None (Room air)  --   07/06/21 1630  --  --  --  --  --  --  None (Room air)  --   07/06/21 1526  97 6 °F (36 4 °C)  121Abnormal   20  117/80  --  100 %  None (Room air)  Sitting       Pertinent Labs/Diagnostic Test Results:       Results from last 7 days   Lab Units 07/06/21  1554   WBC Thousand/uL 8 21   HEMOGLOBIN g/dL 14 6   HEMATOCRIT % 40 6   PLATELETS Thousands/uL 325   NEUTROS ABS Thousands/µL 5 01         Results from last 7 days   Lab Units 07/06/21  1554   SODIUM mmol/L 137   POTASSIUM mmol/L 3 6   CHLORIDE mmol/L 100   CO2 mmol/L 25   ANION GAP mmol/L 12   BUN mg/dL 17   CREATININE mg/dL 1 42* EGFR ml/min/1 73sq m 62   CALCIUM mg/dL 9 7     Results from last 7 days   Lab Units 07/06/21  1601   AST U/L 38   ALT U/L 90*   ALK PHOS U/L 107 0   TOTAL PROTEIN g/dL 7 3   ALBUMIN g/dL 4 8   TOTAL BILIRUBIN mg/dL 0 62   BILIRUBIN DIRECT mg/dL 0 11         Results from last 7 days   Lab Units 07/06/21  1554   GLUCOSE RANDOM mg/dL 97       Results from last 7 days   Lab Units 07/06/21  2350 07/06/21  1915 07/06/21  1554   TROPONIN I ng/mL <0 03 <0 03 <0 03     Results from last 7 days   Lab Units 07/06/21  1601   D-DIMER QUANTITATIVE mg/L FEU 0 27     7/6 CXR:No acute cardiopulmonary disease    7/6 Tao 13          ED Treatment:   Medication Administration from 07/06/2021 1519 to 07/06/2021 1817       Date/Time Order Dose Route Action Action by Comments     07/06/2021 1631 sodium chloride 0 9 % bolus 1,000 mL 1,000 mL Intravenous New Bag          Past Medical History:   Diagnosis Date    Anxiety     Hypercholesteremia          Admitting Diagnosis: Dizziness [R42]  Paresthesias [R20 2]  Dyspnea [R06 00]  SOB (shortness of breath) [R06 02]  Pre-syncope [R55]  T wave inversion in EKG [R94 31]  Age/Sex: 45 y o  male  Admission Orders:  Scheduled Medications:  amLODIPine, 5 mg, Oral, Daily  aspirin, 81 mg, Oral, Daily  atorvastatin, 20 mg, Oral, Daily  docusate sodium, 100 mg, Oral, BID  enoxaparin, 40 mg, Subcutaneous, Daily  escitalopram, 20 mg, Oral, Daily      Continuous IV Infusions:  lactated ringers, 75 mL/hr, Intravenous, Continuous      PRN Meds:  acetaminophen, 650 mg, Oral, Q6H PRN  nitroglycerin, 0 4 mg, Sublingual, Q5 Min PRN  ondansetron, 4 mg, Intravenous, Q6H PRN  sodium chloride (PF), 3 mL, Intravenous, Q1H PRN    SCD  TELE  CARDIAC DIET      IP CONSULT TO CARDIOLOGY    Network Utilization Review Department  ATTENTION: Please call with any questions or concerns to [phone] and carefully listen to the prompts so that you are directed to the right person   All voicemails are confidential   Deric Epp all requests for admission clinical reviews, approved or denied determinations and any other requests to dedicated fax number below belonging to the campus where the patient is receiving treatment   List of dedicated fax numbers for the Facilities:  [address] DENIALS (Administrative/Medical Necessity) [phone]   [address] (Maternity/NICU/Pediatrics) [phone]   [address] [facility] 3317 [id number] [address] Clementine Sellers 1481 [address] [facility] [address] [facility] [phone]

## 2021-07-07 NOTE — NURSING NOTE
AVS summary reviewed  IV removed  Education completed  Work note given to patient  Pt discharged with belongings

## 2021-07-07 NOTE — PLAN OF CARE
Problem: PAIN - ADULT  Goal: Verbalizes/displays adequate comfort level or baseline comfort level  Description: Interventions:  - Encourage patient to monitor pain and request assistance  - Assess pain using appropriate pain scale  - Administer analgesics based on type and severity of pain and evaluate response  - Implement non-pharmacological measures as appropriate and evaluate response  - Consider cultural and social influences on pain and pain management  - Notify physician/advanced practitioner if interventions unsuccessful or patient reports new pain  Outcome: Progressing     Problem: INFECTION - ADULT  Goal: Absence or prevention of progression during hospitalization  Description: INTERVENTIONS:  - Assess and monitor for signs and symptoms of infection  - Monitor lab/diagnostic results  - Monitor all insertion sites, i e  indwelling lines, tubes, and drains  - Monitor endotracheal if appropriate and nasal secretions for changes in amount and color  - [place] appropriate cooling/warming therapies per order  - Administer medications as ordered  - Instruct and encourage patient and family to use good hand hygiene technique  - Identify and instruct in appropriate isolation precautions for identified infection/condition  Outcome: Progressing  Goal: Absence of fever/infection during neutropenic period  Description: INTERVENTIONS:  - Monitor WBC    Outcome: Progressing     Problem: SAFETY ADULT  Goal: Patient will remain free of falls  Description: INTERVENTIONS:  - Educate patient/family on patient safety including physical limitations  - Instruct patient to call for assistance with activity   - Consult OT/PT to assist with strengthening/mobility   - Keep Call bell within reach  - Keep bed low and locked with side rails adjusted as appropriate  - Keep care items and personal belongings within reach  - Initiate and maintain comfort rounds  - Make Fall Risk Sign visible to staff  - Offer Toileting every  Hours, in advance of need  - Initiate/Maintain  bed alarm  - Obtain necessary fall risk management equipment:   - Apply yellow socks and bracelet for high fall risk patients  - Consider moving patient to room near nurses station  Outcome: Progressing  Goal: Maintain or return to baseline ADL function  Description: INTERVENTIONS:  -  Assess patient's ability to carry out ADLs; assess patient's baseline for ADL function and identify physical deficits which impact ability to perform ADLs (bathing, care of mouth/teeth, toileting, grooming, dressing, etc )  - Assess/evaluate cause of self-care deficits   - Assess range of motion  - Assess patient's mobility; develop plan if impaired  - Assess patient's need for assistive devices and provide as appropriate  - Encourage maximum independence but intervene and supervise when necessary  - Involve family in performance of ADLs  - Assess for home care needs following discharge   - Consider OT consult to assist with ADL evaluation and planning for discharge  - Provide patient education as appropriate  Outcome: Progressing  Goal: Maintains/Returns to pre admission functional level  Description: INTERVENTIONS:  - Perform BMAT or MOVE assessment daily    - Set and communicate daily mobility goal to care team and patient/family/caregiver  - Collaborate with rehabilitation services on mobility goals if consulted  - Perform Range of Motion  times a day  - Reposition patient every hours    - Dangle patient  times a day  - Stand patient times a day  - Ambulate patient  times a day  - Out of bed to chair times a day   - Out of bed for meals  times a day  - Out of bed for toileting  - Record patient progress and toleration of activity level   Outcome: Progressing     Problem: DISCHARGE PLANNING  Goal: Discharge to home or other facility with appropriate resources  Description: INTERVENTIONS:  - Identify barriers to discharge w/patient and caregiver  - Arrange for needed discharge resources and transportation as appropriate  - Identify discharge learning needs (meds, wound care, etc )  - Arrange for interpretive services to assist at discharge as needed  - Refer to Case Management Department for coordinating discharge planning if the patient needs post-hospital services based on physician/advanced practitioner order or complex needs related to functional status, cognitive ability, or social support system  Outcome: Progressing     Problem: Knowledge Deficit  Goal: Patient/family/caregiver demonstrates understanding of disease process, treatment plan, medications, and discharge instructions  Description: Complete learning assessment and assess knowledge base    Interventions:  - Provide teaching at level of understanding  - Provide teaching via preferred learning methods  Outcome: Progressing

## 2021-07-07 NOTE — DISCHARGE SUMMARY
Anuradha U  67   Discharge- Awa Evangelista 1980, 45 y o  male MRN: [id number]  Unit/Bed#: -01 Encounter: [id number]  Primary Care Provider: Julito Ramos MD   Date and time admitted to hospital: 7/6/2021  3:25 PM    * T wave inversion in EKG  Assessment & Plan  Patient has dizziness and shortness of breath, denies of any chest pain  No prior EKGs to compare  Likely patient has longstanding hypertension which is untreated  The T-wave inversion noted in V5 and V6 and lead AVF and Will check 2D echocardiogram and trend troponin and also consult Cardiology  EKG was reviewed with Cardiology and likely atypical nonspecific changes  Patient will need 2D echocardiogram which was not able to be done as inpatient  Will give a script for outpatient 2D echocardiogram   Patient has no further symptoms and feels better  Will also given script for outpatient nuclear stress test   Follow-up with Cardiology  HAYDEN (acute kidney injury) ([facility] [address] )  Assessment & Plan  Will give IV fluids , hold hyzaar       HTN (hypertension)  Assessment & Plan  Will hold Hyzaar due to HAYDEN and will resume Norvasc and monitor blood pressure  Postural dizziness with presyncope  Assessment & Plan  Patient was recently started on 2 antihypertensives-blood pressure is better in 120s currently  Patient also noted to have acute kidney injury with dehydration-will give IV hydration  And monitor blood pressure closely                Medical Problems     Resolved Problems  Date Reviewed: 5/13/2021    None                Admission Date:   Admission Orders (From admission, onward)     Ordered        07/06/21 1709  Place in Observation  Once                     Admitting Diagnosis: Dizziness [R42]  Paresthesias [R20 2]  Dyspnea [R06 00]  SOB (shortness of breath) [R06 02]  Pre-syncope [R55]  T wave inversion in EKG [R94 31]    HPI: Awa Evangelista is a 45 y o  male with a PMH of HTN  of who presents with   Complaints of dizziness and tiredness since the past 3 days  Patient stated that he was recently diagnosed uncontrolled blood pressure and 2 weeks ago he was started on Hyzaar 100 mg/12 5 mg and he followed up a week later and his blood pressure was still elevated will go to 170s to 190/108 and a he was recommended to take Norvasc 5 mg daily  Patient stated that since then he is feeling tired and lethargic and today he went to work and he could not breathe and 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  Patient stated that he could not finish talking and he sat down  Patient denies of any chest pain  Patient denies of any headache or blurry vision  Patient however felt dizzy and lightheaded but no loss of consciousness  The patient usually is tachycardic around 90s heart rate but today he was around 120s  EKG was obtained in the ED which showed generalized T-wave inversion  No prior EKGs to compare  Cardiology was contacted from the ED and recommended admit in [facility]  Procedures Performed: No orders of the defined types were placed in this encounter  Summary of Hospital Course:  Patient is a 28-year-old male with history of hypertension was recently started on antihypertensive Hyzaar and Norvasc and presented with dizziness and tiredness and lethargy for the past 3 days  Patient was at work lifting heavy weights of at least 50-60 lb and felt weak and dizzy and short of breath  Patient denied of any chest pain  EKG showed some nonspecific T-wave inversion and troponins have been negative x3  Patient was evaluated by Cardiology recommended 2D echocardiogram however patient could not wait another day for the test and wanted to be discharged home and wanted outpatient follow-up and recommended outpatient echocardiogram and stress test   Patient recommended to stop Hyzaar and resume Norvasc and follow-up outpatient Bmp  Renal function has improved with IV hydration  Patient feels better and no further symptoms  HEENT-PERRLA, moist oral mucosa  Neck-supple, no JVD elevation   Respiratory-equal air entry bilaterally, no rales or rhonchi  Cardiovascular system-S1, S2 heard, no murmur or gallops or rubs  Abdomen-soft, nontender, no guarding or rigidity, bowel sounds heard  Extremities-no pedal edema  Peripheral pulses palpable  Musculoskeletal-no contractures  Central nervous system-no acute focal neurological deficit ,no sensory or motor deficit noted  Skin-no rash noted      Significant Findings, Care, Treatment and Services Provided:  Sinus rhythm on EKG and telemetry    Complications: nil    Condition at Discharge: good         Discharge instructions/Information to patient and family:   See after visit summary for information provided to patient and family  Provisions for Follow-Up Care:  See after visit summary for information related to follow-up care and any pertinent home health orders  PCP: Ronni Shah MD    Disposition: Home    Planned Readmission: No    Discharge Statement   I spent 45 minutes discharging the patient  This time was spent on the day of discharge  I had direct contact with the patient on the day of discharge  Additional documentation is required if more than 30 minutes were spent on discharge  Discharge Medications:  See after visit summary for reconciled discharge medications provided to patient and family

## 2021-07-07 NOTE — CONSULTS
Consultation - Cardiology   Liban Aguilar 45 y o  male MRN: [id number]  Unit/Bed#: -01 Encounter: [id number]    Assessment/Plan     Assessment/Recommendations:  1  Abnormal ECG: nonspecific T wave changes seen, likely due to recent uncontrolled HTN  Will review echocardiogram, once available  2   HTN: Currently, agree with resuming amlodipine and then add back Hyzaar only if required by BP at a later time- goal BP in 130's/80/s  3   Postural dizziness without syncope: agree that this is likely due to dehydration  Agree with continued IVF and holding off on Hyzaar to avoid giving diuretic in the setting of dehydration for the time being  4   HAYDEN: likely due to volume depletion, agree with IVF hydration  5   If echo without significant structural heart disease, patient can be discharged with further follow up as outpatient for BP management  History of Present Illness   Physician Requesting Consult: Blanche Hart MD  Reason for Consult / Principal Problem: new T wave inversions on EKG  HPI: Liabn Aguilar is a 45y o  year old male who presents with recently diagnosed HTN and started on 2 agents for BP control (initially started on Hyzaar, then started on amlodipine a week later due to continued uncontrolled HTN) was admitted due to dizziness, fatigue, and acute onset shortness of breath while at work  He was lifting heavy boxes at the time and felt that he had to stop to catch his breath   No chest pain, syncope, LE edema, orthopnea, PND, recent weight changes, headache, blurry vision, recent viral illnesses  He was noted to have T wave inversions on his EKG and we are consulted for further management  Inpatient consult to Cardiology  Consult performed by: Neel Merlos MD  Consult ordered by: Blanche Hart MD          Review of Systems   Constitutional: Positive for fatigue  Negative for activity change, appetite change and fever  HENT: Negative for nosebleeds and sore throat  Eyes: Negative for photophobia and visual disturbance  Respiratory: Positive for shortness of breath  Negative for cough, chest tightness and wheezing  Cardiovascular: Negative for chest pain, palpitations and leg swelling  Gastrointestinal: Negative for abdominal pain, diarrhea, nausea and vomiting  Endocrine: Negative for polyuria  Genitourinary: Negative for dysuria, frequency and hematuria  Musculoskeletal: Negative for arthralgias, back pain and gait problem  Skin: Negative for pallor and rash  Neurological: Positive for light-headedness  Negative for dizziness, syncope and speech difficulty  Hematological: Does not bruise/bleed easily  Psychiatric/Behavioral: Negative for agitation, behavioral problems and confusion  Historical Information   Past Medical History:   Diagnosis Date    Anxiety     Hypercholesteremia      History reviewed  No pertinent surgical history    Social History     Substance and Sexual Activity   Alcohol Use Yes    Comment: Occasional      Social History     Substance and Sexual Activity   Drug Use Never    Comment: Illicit drugs:   none  - As per [place]      E-Cigarette/Vaping    E-Cigarette Use Never User      E-Cigarette/Vaping Substances     Social History     Tobacco Use   Smoking Status Former Smoker    Packs/day: 0 25   Smokeless Tobacco Never Used     Family History:   Family History   Problem Relation Age of Onset    No Known Problems Mother     No Known Problems Father        Meds/Allergies   all current active meds have been reviewed, current meds:   Current Facility-Administered Medications   Medication Dose Route Frequency    acetaminophen (TYLENOL) tablet 650 mg  650 mg Oral Q6H PRN    amLODIPine (NORVASC) tablet 5 mg  5 mg Oral Daily    aspirin chewable tablet 81 mg  81 mg Oral Daily    atorvastatin (LIPITOR) tablet 20 mg  20 mg Oral Daily    docusate sodium (COLACE) capsule 100 mg  100 mg Oral BID    enoxaparin (LOVENOX) subcutaneous injection 40 mg  40 mg Subcutaneous Daily    escitalopram (LEXAPRO) tablet 20 mg  20 mg Oral Daily    lactated ringers infusion  75 mL/hr Intravenous Continuous    nitroglycerin (NITROSTAT) SL tablet 0 4 mg  0 4 mg Sublingual Q5 Min PRN    ondansetron (ZOFRAN) injection 4 mg  4 mg Intravenous Q6H PRN    sodium chloride (PF) 0 9 % injection 3 mL  3 mL Intravenous Q1H PRN    and PTA meds:   Prior to Admission Medications   Prescriptions Last Dose Informant Patient Reported? Taking? amLODIPine (NORVASC) 2 5 mg tablet  Self Yes Yes   Sig: Take 2 5 mg by mouth daily Pt unsure of dose at this time lowest dose used   atorvastatin (LIPITOR) 20 mg tablet 7/6/2021 at Unknown time Self Yes Yes   Sig: Take 20 mg by mouth daily   escitalopram (LEXAPRO) 20 mg tablet 7/6/2021 at Unknown time Self Yes Yes   Sig: Take 20 mg by mouth daily      Facility-Administered Medications: None     Allergies   Allergen Reactions    Pollen Extract Other (See Comments)       Objective   Vitals: Blood pressure 131/86, pulse 84, temperature (!) 96 9 °F (36 1 °C), temperature source Tympanic, resp  rate 18, height 5' 10" (1 778 m), weight 111 kg (245 lb), SpO2 98 %  Orthostatic Blood Pressures      Most Recent Value   Blood Pressure  131/86 filed at 07/07/2021 8104   Patient Position - Orthostatic VS  Lying filed at 07/07/2021 0829          No intake or output data in the 24 hours ending 07/07/21 0935    Invasive Devices     Peripheral Intravenous Line            Peripheral IV 07/06/21 Left Antecubital <1 day                Physical Exam  Vitals and nursing note reviewed  Constitutional:       General: He is not in acute distress  Appearance: He is well-developed  He is not diaphoretic  HENT:      Head: Normocephalic and atraumatic  Nose: Nose normal    Eyes:      General: No scleral icterus  Pupils: Pupils are equal, round, and reactive to light  Neck:      Vascular: No JVD     Cardiovascular:      Rate and Rhythm: Normal rate and regular rhythm  Heart sounds: Normal heart sounds  No murmur heard  No friction rub  No gallop  Pulmonary:      Effort: Pulmonary effort is normal  No respiratory distress  Breath sounds: Normal breath sounds  No wheezing or rales  Abdominal:      General: Bowel sounds are normal  There is no distension  Palpations: Abdomen is soft  Tenderness: There is no abdominal tenderness  Musculoskeletal:      Cervical back: Normal range of motion and neck supple  Skin:     General: Skin is warm and dry  Findings: No rash  Neurological:      Mental Status: He is alert and oriented to person, place, and time  Cranial Nerves: No cranial nerve deficit  Psychiatric:         Behavior: Behavior normal          Lab Results:   I have personally reviewed pertinent lab results  CBC with diff:   Results from last 7 days   Lab Units 07/06/21  1554   WBC Thousand/uL 8 21   RBC Million/uL 4 96   HEMOGLOBIN g/dL 14 6   HEMATOCRIT % 40 6   MCV fL 82   MCH pg 29 4   MCHC g/dL 36 0   RDW % 13 0   MPV fL 9 2   PLATELETS Thousands/uL 325     CMP:   Results from last 7 days   Lab Units 07/06/21  1601 07/06/21  1554   SODIUM mmol/L  --  137   POTASSIUM mmol/L  --  3 6   CHLORIDE mmol/L  --  100   CO2 mmol/L  --  25   BUN mg/dL  --  17   CREATININE mg/dL  --  1 42*   CALCIUM mg/dL  --  9 7   AST U/L 38  --    ALT U/L 90*  --    ALK PHOS U/L 107 0  --    EGFR ml/min/1 73sq m  --  62     Troponin:   0   Lab Value Date/Time    TROPONINI <0 03 07/06/2021 2350    TROPONINI <0 03 07/06/2021 1915    TROPONINI <0 03 07/06/2021 1554     BNP:   Results from last 7 days   Lab Units 07/06/21  1554   POTASSIUM mmol/L 3 6   CHLORIDE mmol/L 100   CO2 mmol/L 25   BUN mg/dL 17   CREATININE mg/dL 1 42*   CALCIUM mg/dL 9 7   EGFR ml/min/1 73sq m 62     Coags:     TSH:     Magnesium:     Lipid Profile:     Imaging: I have personally reviewed pertinent reports     and I have personally reviewed pertinent films in PACS  EKG: Sinus tachycardia, LAE, nonspecific T wave changes

## 2021-07-07 NOTE — PLAN OF CARE
Problem: INFECTION - ADULT  Goal: Absence or prevention of progression during hospitalization  Description: INTERVENTIONS:  - Assess and monitor for signs and symptoms of infection  - Monitor lab/diagnostic results    Problem: Knowledge Deficit  Goal: Patient/family/caregiver demonstrates understanding of disease process, treatment plan, medications, and discharge instructions  Description: Complete learning assessment and assess knowledge base    Interventions:  - Provide teaching at level of understanding  - Provide teaching via preferred learning methods  Outcome: Progressing     Problem: PAIN - ADULT  Goal: Verbalizes/displays adequate comfort level or baseline comfort level  Description: Interventions:  - Encourage patient to monitor pain and request assistance  - Assess pain using appropriate pain scale  - Administer analgesics based on type and severity of pain and evaluate response  - Implement non-pharmacological measures as appropriate and evaluate response  - Consider cultural and social influences on pain and pain management  - Notify physician/advanced practitioner if interventions unsuccessful or patient reports new pain  Outcome: Progressing   - Administer medications as ordered  - Instruct and encourage patient and family to use good hand hygiene technique  - Identify and instruct in appropriate isolation precautions for identified infection/condition  Outcome: Progressing  Goal: Absence of fever/infection during neutropenic period  Description: INTERVENTIONS:  - Monitor WBC    Outcome: Progressing

## 2021-07-07 NOTE — ASSESSMENT & PLAN NOTE
Patient has dizziness and shortness of breath, denies of any chest pain  No prior EKGs to compare  Likely patient has longstanding hypertension which is untreated  The T-wave inversion noted in V5 and V6 and lead AVF and Will check 2D echocardiogram and trend troponin and also consult Cardiology  EKG was reviewed with Cardiology and likely atypical nonspecific changes  Patient will need 2D echocardiogram which was not able to be done as inpatient  Will give a script for outpatient 2D echocardiogram   Patient has no further symptoms and feels better  Will also given script for outpatient nuclear stress test   Follow-up with Cardiology

## 2021-07-08 LAB
ATRIAL RATE: 109 BPM
ATRIAL RATE: 94 BPM
P AXIS: 48 DEGREES
P AXIS: 55 DEGREES
PR INTERVAL: 156 MS
PR INTERVAL: 180 MS
QRS AXIS: 24 DEGREES
QRS AXIS: 46 DEGREES
QRSD INTERVAL: 75 MS
QRSD INTERVAL: 76 MS
QT INTERVAL: 315 MS
QT INTERVAL: 392 MS
QTC INTERVAL: 425 MS
QTC INTERVAL: 488 MS
T WAVE AXIS: -22 DEGREES
T WAVE AXIS: 20 DEGREES
VENTRICULAR RATE: 109 BPM
VENTRICULAR RATE: 93 BPM

## 2021-07-08 PROCEDURE — 93010 ELECTROCARDIOGRAM REPORT: CPT | Performed by: INTERNAL MEDICINE

## 2021-08-31 ENCOUNTER — HOSPITAL ENCOUNTER (OUTPATIENT)
Dept: RADIOLOGY | Facility: HOSPITAL | Age: 41
Discharge: HOME | End: 2021-08-31
Payer: COMMERCIAL

## 2021-08-31 ENCOUNTER — HOSPITAL ENCOUNTER (OUTPATIENT)
Dept: NON INVASIVE DIAGNOSTICS | Facility: HOSPITAL | Age: 41
Discharge: HOME | End: 2021-08-31
Payer: COMMERCIAL

## 2021-08-31 DIAGNOSIS — R55 POSTURAL DIZZINESS WITH PRESYNCOPE: ICD-10-CM

## 2021-08-31 DIAGNOSIS — R42 POSTURAL DIZZINESS WITH PRESYNCOPE: ICD-10-CM

## 2021-08-31 DIAGNOSIS — R94.31 T WAVE INVERSION IN EKG: ICD-10-CM

## 2021-08-31 LAB
CHEST PAIN STATEMENT: NORMAL
MAX DIASTOLIC BP: 84 MMHG
MAX HEART RATE: 155 BPM
MAX PREDICTED HEART RATE: 180 BPM
MAX. SYSTOLIC BP: 182 MMHG
PROTOCOL NAME: NORMAL
TARGET HR FORMULA: NORMAL
TEST INDICATION: NORMAL
TIME IN EXERCISE PHASE: NORMAL

## 2021-08-31 PROCEDURE — 78452 HT MUSCLE IMAGE SPECT MULT: CPT

## 2021-08-31 PROCEDURE — 93306 TTE W/DOPPLER COMPLETE: CPT

## 2021-08-31 PROCEDURE — 93017 CV STRESS TEST TRACING ONLY: CPT

## 2021-08-31 PROCEDURE — G1004 CDSM NDSC: HCPCS

## 2021-08-31 PROCEDURE — A9502 TC99M TETROFOSMIN: HCPCS

## 2021-08-31 RX ADMIN — PERFLUTREN 1 ML/MIN: 6.52 INJECTION, SUSPENSION INTRAVENOUS at 08:05

## 2021-09-01 PROCEDURE — 93016 CV STRESS TEST SUPVJ ONLY: CPT | Performed by: INTERNAL MEDICINE

## 2021-09-01 PROCEDURE — 93306 TTE W/DOPPLER COMPLETE: CPT | Performed by: INTERNAL MEDICINE

## 2021-09-01 PROCEDURE — 78452 HT MUSCLE IMAGE SPECT MULT: CPT | Performed by: INTERNAL MEDICINE

## 2021-09-01 PROCEDURE — 93018 CV STRESS TEST I&R ONLY: CPT | Performed by: INTERNAL MEDICINE

## 2021-10-25 ENCOUNTER — APPOINTMENT (OUTPATIENT)
Dept: RADIOLOGY | Facility: MEDICAL CENTER | Age: 41
End: 2021-10-25
Payer: COMMERCIAL

## 2021-10-25 ENCOUNTER — OFFICE VISIT (OUTPATIENT)
Dept: OBGYN CLINIC | Facility: MEDICAL CENTER | Age: 41
End: 2021-10-25
Payer: OTHER MISCELLANEOUS

## 2021-10-25 VITALS
DIASTOLIC BLOOD PRESSURE: 91 MMHG | WEIGHT: 255 LBS | HEART RATE: 93 BPM | BODY MASS INDEX: 36.51 KG/M2 | HEIGHT: 70 IN | SYSTOLIC BLOOD PRESSURE: 135 MMHG

## 2021-10-25 DIAGNOSIS — S42.024D CLOSED NONDISPLACED FRACTURE OF SHAFT OF RIGHT CLAVICLE WITH ROUTINE HEALING, SUBSEQUENT ENCOUNTER: ICD-10-CM

## 2021-10-25 DIAGNOSIS — G58.9 PINCHED NERVE IN NECK: Primary | ICD-10-CM

## 2021-10-25 PROCEDURE — 73000 X-RAY EXAM OF COLLAR BONE: CPT

## 2021-10-25 PROCEDURE — 99213 OFFICE O/P EST LOW 20 MIN: CPT | Performed by: ORTHOPAEDIC SURGERY

## 2021-11-08 ENCOUNTER — EVALUATION (OUTPATIENT)
Dept: PHYSICAL THERAPY | Facility: CLINIC | Age: 41
End: 2021-11-08
Payer: OTHER MISCELLANEOUS

## 2021-11-08 DIAGNOSIS — M25.511 ACUTE PAIN OF RIGHT SHOULDER: Primary | ICD-10-CM

## 2021-11-08 DIAGNOSIS — M54.2 NECK PAIN: ICD-10-CM

## 2021-11-08 PROCEDURE — 97110 THERAPEUTIC EXERCISES: CPT

## 2021-11-08 PROCEDURE — 97162 PT EVAL MOD COMPLEX 30 MIN: CPT

## 2021-11-15 ENCOUNTER — OFFICE VISIT (OUTPATIENT)
Dept: PHYSICAL THERAPY | Facility: CLINIC | Age: 41
End: 2021-11-15
Payer: OTHER MISCELLANEOUS

## 2021-11-15 DIAGNOSIS — M54.2 NECK PAIN: ICD-10-CM

## 2021-11-15 DIAGNOSIS — M25.511 ACUTE PAIN OF RIGHT SHOULDER: Primary | ICD-10-CM

## 2021-11-15 PROCEDURE — 97140 MANUAL THERAPY 1/> REGIONS: CPT

## 2021-11-15 PROCEDURE — 97110 THERAPEUTIC EXERCISES: CPT

## 2021-11-15 PROCEDURE — 97112 NEUROMUSCULAR REEDUCATION: CPT

## 2021-11-18 ENCOUNTER — OFFICE VISIT (OUTPATIENT)
Dept: PHYSICAL THERAPY | Facility: CLINIC | Age: 41
End: 2021-11-18
Payer: OTHER MISCELLANEOUS

## 2021-11-18 DIAGNOSIS — M25.511 ACUTE PAIN OF RIGHT SHOULDER: Primary | ICD-10-CM

## 2021-11-18 DIAGNOSIS — M54.2 NECK PAIN: ICD-10-CM

## 2021-11-18 PROCEDURE — 97110 THERAPEUTIC EXERCISES: CPT | Performed by: PHYSICAL THERAPIST

## 2021-11-18 PROCEDURE — 97112 NEUROMUSCULAR REEDUCATION: CPT | Performed by: PHYSICAL THERAPIST

## 2021-11-22 ENCOUNTER — OFFICE VISIT (OUTPATIENT)
Dept: PHYSICAL THERAPY | Facility: CLINIC | Age: 41
End: 2021-11-22
Payer: OTHER MISCELLANEOUS

## 2021-11-22 DIAGNOSIS — M25.511 ACUTE PAIN OF RIGHT SHOULDER: Primary | ICD-10-CM

## 2021-11-22 DIAGNOSIS — M54.2 NECK PAIN: ICD-10-CM

## 2021-11-22 PROCEDURE — 97112 NEUROMUSCULAR REEDUCATION: CPT

## 2021-11-22 PROCEDURE — 97110 THERAPEUTIC EXERCISES: CPT

## 2021-11-22 PROCEDURE — 97140 MANUAL THERAPY 1/> REGIONS: CPT

## 2021-11-24 ENCOUNTER — APPOINTMENT (OUTPATIENT)
Dept: PHYSICAL THERAPY | Facility: CLINIC | Age: 41
End: 2021-11-24
Payer: OTHER MISCELLANEOUS

## 2021-11-26 ENCOUNTER — OFFICE VISIT (OUTPATIENT)
Dept: PHYSICAL THERAPY | Facility: CLINIC | Age: 41
End: 2021-11-26
Payer: OTHER MISCELLANEOUS

## 2021-11-26 DIAGNOSIS — M54.2 NECK PAIN: ICD-10-CM

## 2021-11-26 DIAGNOSIS — M25.511 ACUTE PAIN OF RIGHT SHOULDER: Primary | ICD-10-CM

## 2021-11-26 PROCEDURE — 97112 NEUROMUSCULAR REEDUCATION: CPT

## 2021-11-26 PROCEDURE — 97110 THERAPEUTIC EXERCISES: CPT

## 2021-11-26 PROCEDURE — 97140 MANUAL THERAPY 1/> REGIONS: CPT

## 2021-11-29 ENCOUNTER — OFFICE VISIT (OUTPATIENT)
Dept: PHYSICAL THERAPY | Facility: CLINIC | Age: 41
End: 2021-11-29
Payer: OTHER MISCELLANEOUS

## 2021-11-29 DIAGNOSIS — M25.511 ACUTE PAIN OF RIGHT SHOULDER: Primary | ICD-10-CM

## 2021-11-29 DIAGNOSIS — M54.2 NECK PAIN: ICD-10-CM

## 2021-11-29 PROCEDURE — 97140 MANUAL THERAPY 1/> REGIONS: CPT

## 2021-11-29 PROCEDURE — 97110 THERAPEUTIC EXERCISES: CPT

## 2021-12-02 ENCOUNTER — OFFICE VISIT (OUTPATIENT)
Dept: PHYSICAL THERAPY | Facility: CLINIC | Age: 41
End: 2021-12-02
Payer: OTHER MISCELLANEOUS

## 2021-12-02 DIAGNOSIS — M25.511 ACUTE PAIN OF RIGHT SHOULDER: Primary | ICD-10-CM

## 2021-12-02 DIAGNOSIS — M54.2 NECK PAIN: ICD-10-CM

## 2021-12-02 PROCEDURE — 97112 NEUROMUSCULAR REEDUCATION: CPT

## 2021-12-02 PROCEDURE — 97140 MANUAL THERAPY 1/> REGIONS: CPT

## 2021-12-02 PROCEDURE — 97110 THERAPEUTIC EXERCISES: CPT

## 2021-12-06 ENCOUNTER — APPOINTMENT (OUTPATIENT)
Dept: PHYSICAL THERAPY | Facility: CLINIC | Age: 41
End: 2021-12-06
Payer: OTHER MISCELLANEOUS

## 2021-12-08 ENCOUNTER — APPOINTMENT (OUTPATIENT)
Dept: PHYSICAL THERAPY | Facility: CLINIC | Age: 41
End: 2021-12-08
Payer: OTHER MISCELLANEOUS

## 2021-12-09 ENCOUNTER — APPOINTMENT (OUTPATIENT)
Dept: PHYSICAL THERAPY | Facility: CLINIC | Age: 41
End: 2021-12-09
Payer: OTHER MISCELLANEOUS

## 2021-12-13 ENCOUNTER — APPOINTMENT (OUTPATIENT)
Dept: PHYSICAL THERAPY | Facility: CLINIC | Age: 41
End: 2021-12-13
Payer: OTHER MISCELLANEOUS

## 2021-12-14 ENCOUNTER — CONSULT (OUTPATIENT)
Dept: PAIN MEDICINE | Facility: CLINIC | Age: 41
End: 2021-12-14
Payer: OTHER MISCELLANEOUS

## 2021-12-14 VITALS
DIASTOLIC BLOOD PRESSURE: 96 MMHG | WEIGHT: 252 LBS | BODY MASS INDEX: 36.16 KG/M2 | HEART RATE: 103 BPM | SYSTOLIC BLOOD PRESSURE: 158 MMHG

## 2021-12-14 DIAGNOSIS — S42.001A CLOSED NONDISPLACED FRACTURE OF RIGHT CLAVICLE, UNSPECIFIED PART OF CLAVICLE, INITIAL ENCOUNTER: ICD-10-CM

## 2021-12-14 DIAGNOSIS — M54.12 CERVICAL RADICULOPATHY: ICD-10-CM

## 2021-12-14 DIAGNOSIS — R29.898 WEAKNESS OF EXTREMITY: ICD-10-CM

## 2021-12-14 DIAGNOSIS — M54.2 NECK PAIN: Primary | ICD-10-CM

## 2021-12-14 PROCEDURE — 99244 OFF/OP CNSLTJ NEW/EST MOD 40: CPT | Performed by: PHYSICAL MEDICINE & REHABILITATION

## 2021-12-15 ENCOUNTER — HOSPITAL ENCOUNTER (OUTPATIENT)
Dept: RADIOLOGY | Facility: HOSPITAL | Age: 41
Discharge: HOME | End: 2021-12-15
Attending: PHYSICAL MEDICINE & REHABILITATION
Payer: OTHER MISCELLANEOUS

## 2021-12-15 DIAGNOSIS — M54.12 CERVICAL RADICULOPATHY: ICD-10-CM

## 2021-12-15 DIAGNOSIS — R29.898 WEAKNESS OF EXTREMITY: ICD-10-CM

## 2021-12-15 DIAGNOSIS — M54.2 NECK PAIN: ICD-10-CM

## 2021-12-15 DIAGNOSIS — S42.001A CLOSED NONDISPLACED FRACTURE OF RIGHT CLAVICLE, UNSPECIFIED PART OF CLAVICLE, INITIAL ENCOUNTER: ICD-10-CM

## 2021-12-15 PROCEDURE — 72050 X-RAY EXAM NECK SPINE 4/5VWS: CPT

## 2021-12-16 ENCOUNTER — OFFICE VISIT (OUTPATIENT)
Dept: PHYSICAL THERAPY | Facility: CLINIC | Age: 41
End: 2021-12-16
Payer: OTHER MISCELLANEOUS

## 2021-12-16 DIAGNOSIS — M25.511 ACUTE PAIN OF RIGHT SHOULDER: ICD-10-CM

## 2021-12-16 DIAGNOSIS — M54.2 NECK PAIN: Primary | ICD-10-CM

## 2021-12-16 PROCEDURE — 97110 THERAPEUTIC EXERCISES: CPT

## 2021-12-16 PROCEDURE — 97112 NEUROMUSCULAR REEDUCATION: CPT

## 2021-12-20 ENCOUNTER — APPOINTMENT (OUTPATIENT)
Dept: PHYSICAL THERAPY | Facility: CLINIC | Age: 41
End: 2021-12-20
Payer: OTHER MISCELLANEOUS

## 2021-12-21 ENCOUNTER — OFFICE VISIT (OUTPATIENT)
Dept: PHYSICAL THERAPY | Facility: CLINIC | Age: 41
End: 2021-12-21
Payer: OTHER MISCELLANEOUS

## 2021-12-21 DIAGNOSIS — M54.2 NECK PAIN: Primary | ICD-10-CM

## 2021-12-21 DIAGNOSIS — M25.511 ACUTE PAIN OF RIGHT SHOULDER: ICD-10-CM

## 2021-12-21 PROCEDURE — 97110 THERAPEUTIC EXERCISES: CPT | Performed by: PHYSICAL THERAPIST

## 2021-12-21 PROCEDURE — 97140 MANUAL THERAPY 1/> REGIONS: CPT | Performed by: PHYSICAL THERAPIST

## 2021-12-23 ENCOUNTER — HOSPITAL ENCOUNTER (OUTPATIENT)
Dept: SLEEP CENTER | Facility: CLINIC | Age: 41
Discharge: HOME | End: 2021-12-23
Payer: COMMERCIAL

## 2021-12-23 ENCOUNTER — OFFICE VISIT (OUTPATIENT)
Dept: PHYSICAL THERAPY | Facility: CLINIC | Age: 41
End: 2021-12-23
Payer: OTHER MISCELLANEOUS

## 2021-12-23 DIAGNOSIS — R40.0 DAYTIME SLEEPINESS: ICD-10-CM

## 2021-12-23 DIAGNOSIS — M54.2 NECK PAIN: ICD-10-CM

## 2021-12-23 DIAGNOSIS — R06.81 WITNESSED EPISODE OF APNEA: ICD-10-CM

## 2021-12-23 DIAGNOSIS — M25.511 ACUTE PAIN OF RIGHT SHOULDER: Primary | ICD-10-CM

## 2021-12-23 DIAGNOSIS — R06.83 SNORING: ICD-10-CM

## 2021-12-23 PROCEDURE — 97112 NEUROMUSCULAR REEDUCATION: CPT

## 2021-12-23 PROCEDURE — 97140 MANUAL THERAPY 1/> REGIONS: CPT

## 2021-12-23 PROCEDURE — 95810 POLYSOM 6/> YRS 4/> PARAM: CPT

## 2021-12-23 PROCEDURE — 97110 THERAPEUTIC EXERCISES: CPT

## 2021-12-27 ENCOUNTER — OFFICE VISIT (OUTPATIENT)
Dept: OBGYN CLINIC | Facility: MEDICAL CENTER | Age: 41
End: 2021-12-27
Payer: OTHER MISCELLANEOUS

## 2021-12-27 ENCOUNTER — OFFICE VISIT (OUTPATIENT)
Dept: PHYSICAL THERAPY | Facility: CLINIC | Age: 41
End: 2021-12-27
Payer: OTHER MISCELLANEOUS

## 2021-12-27 VITALS
WEIGHT: 252 LBS | SYSTOLIC BLOOD PRESSURE: 148 MMHG | HEIGHT: 70 IN | BODY MASS INDEX: 36.08 KG/M2 | DIASTOLIC BLOOD PRESSURE: 98 MMHG

## 2021-12-27 DIAGNOSIS — G58.9 PINCHED NERVE IN NECK: Primary | ICD-10-CM

## 2021-12-27 DIAGNOSIS — M54.2 NECK PAIN: ICD-10-CM

## 2021-12-27 DIAGNOSIS — M25.511 ACUTE PAIN OF RIGHT SHOULDER: Primary | ICD-10-CM

## 2021-12-27 PROCEDURE — 97112 NEUROMUSCULAR REEDUCATION: CPT

## 2021-12-27 PROCEDURE — 99213 OFFICE O/P EST LOW 20 MIN: CPT | Performed by: ORTHOPAEDIC SURGERY

## 2021-12-27 PROCEDURE — 97110 THERAPEUTIC EXERCISES: CPT

## 2021-12-29 ENCOUNTER — TELEPHONE (OUTPATIENT)
Dept: SLEEP CENTER | Facility: CLINIC | Age: 41
End: 2021-12-29

## 2021-12-29 ENCOUNTER — APPOINTMENT (OUTPATIENT)
Dept: PHYSICAL THERAPY | Facility: CLINIC | Age: 41
End: 2021-12-29
Payer: OTHER MISCELLANEOUS

## 2022-01-05 ENCOUNTER — HOSPITAL ENCOUNTER (OUTPATIENT)
Dept: MRI IMAGING | Facility: HOSPITAL | Age: 42
Discharge: HOME | End: 2022-01-05
Attending: PHYSICAL MEDICINE & REHABILITATION

## 2022-01-05 DIAGNOSIS — R29.898 WEAKNESS OF EXTREMITY: ICD-10-CM

## 2022-01-05 DIAGNOSIS — M54.12 CERVICAL RADICULOPATHY: ICD-10-CM

## 2022-01-05 DIAGNOSIS — S42.001A CLOSED NONDISPLACED FRACTURE OF RIGHT CLAVICLE, UNSPECIFIED PART OF CLAVICLE, INITIAL ENCOUNTER: ICD-10-CM

## 2022-01-05 DIAGNOSIS — M54.2 NECK PAIN: ICD-10-CM

## 2022-01-10 ENCOUNTER — EVALUATION (OUTPATIENT)
Dept: PHYSICAL THERAPY | Facility: CLINIC | Age: 42
End: 2022-01-10
Payer: OTHER MISCELLANEOUS

## 2022-01-10 DIAGNOSIS — M25.511 ACUTE PAIN OF RIGHT SHOULDER: Primary | ICD-10-CM

## 2022-01-10 DIAGNOSIS — M54.2 NECK PAIN: ICD-10-CM

## 2022-01-10 PROCEDURE — 97110 THERAPEUTIC EXERCISES: CPT

## 2022-01-10 PROCEDURE — 97112 NEUROMUSCULAR REEDUCATION: CPT

## 2022-01-10 PROCEDURE — 97140 MANUAL THERAPY 1/> REGIONS: CPT

## 2022-01-10 NOTE — PROGRESS NOTES
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  Patient reports a 75% improvement through course of PT at this time  Denies symptoms of pain, but main complaint at this time is weakness and fatigue and R shoulder and arm  Patient has demonstrated some strength improvements over the course of PT but does continue to still present with some weakness of shoulder stabilizers and increased neural tension on R  Patient reports primary functional limitations at this time are lifting and carrying  Patient would benefit from continued skilled PT in order to maximize participation in returning to work  Impairments: abnormal or restricted ROM, impaired physical strength and pain with function  Functional limitations: lifting, carrying, looking up, turning head R, sleeping  Symptom irritability: moderateUnderstanding of Dx/Px/POC: good   Prognosis: good    Goals  STG (3 weeks)  1  Pt to be I in HEP  Met  2 Pt to reduce pain at rest by 50%  Met  LTG (By DC)  1 Pt to reduce pain with activity by 50%  Met  2 Pt to improve FOTO score to predicted dc value  Partially met  3  Pt to manage symptoms independently  Partially met        Plan  Patient would benefit from: skilled physical therapy  Planned therapy interventions: home exercise program, flexibility, functional ROM exercises, graded activity, graded exercise, strengthening, stretching, therapeutic activities, therapeutic exercise, patient education, postural training, manual therapy, massage, joint mobilization, muscle pump exercises, neuromuscular re-education, abdominal trunk stabilization, balance, balance/weight bearing training, behavior modification and body mechanics training  Frequency: 2x week  Duration in visits: 12  Duration in weeks: 6  Plan of Care beginning date: 11/8/2021  Plan of Care expiration date: 2/11/2022  Treatment plan discussed with: patient        Subjective    Etiology of symptoms: Patient reports in the summer he began getting pain from the R side of the neck into the arm  States his arm would fatigue quickly  Intermittent paresthesias into the R medial aspect of the hand  Nature of Pain: Dull pain posterior shoulder, R bicep and elbow  Current Pain: 0/10  At Best: 0/10  At Worst: 7/10  Aggravating Factors: Turning the head, looking up, Lifting, carrying, sleeping  Relieving Factors: OTC medication  Irritability: moderate  Stability: worsening    Occupation:  and     Primary functional impairments:  1  Lifting and carrying  2  Turning the head  3  Looking up    Patient Goals:  1  Be able to get back to work - Met, patient to return to work this week  2  Get stronger - partially met, patient feels strength is about 75% / 100      Objective     Red Flags: Negative for night pain, unexplained weight loss, bowel or bladder dysfunction, saddle anesthesia, hx of Ca, fever, chills, N/V, changes in vision, dizziness, gait disturbances  + headaches    Neuro Screen:  Reflexes: 2+ patellar  Dermatomes:  In tact to light touch  Myotomes: WNL  Herrera's: Neg    Range of Motion:    Cervical ROM  Flexion WNL   Extension 25% limit   R Lateral Flexion WNL   L Lateral Flexion WNL   R Rotation WNL P   L Rotation WNL     Shoulder ROM   Right Left   Flexion WNL WNL   Extension     Abduction  WNL   Internal Rotation 40 deg p WNL   External Rotation WNL WNL Manual Muscle Testing:    Upper Quarter    Right Left   Shoulder flexion 4+ p 5   Shoulder extension     Shoulder abduction 4+ p 5   Shoulder internal rotation 4 5   Shoulder external rotation 4 5   Elbow flexion 5 5   Elbow extension 5 5   Wrist flexion 5 5   Wrist extension 5 5   Finger abduction 4- 4   Opposition 4- 5       Special Testing:  (+) Spurlings, cv distraction, empty can, ULTT 1  (-) ULTT 2 and 3, apprehension, yergasons, [employer], neer, full can    Palpation:  No TTP         PMH: HTN   Manuals 11/22 11/26 11/29 12/2 12/16 12/21 12/23 12/27 1/10   Manual cervical tx SD SD JK SD SD AF SD SD SD   Median n glide SD                  TPR     5' R Utrap SD   AF        UT Stretch       SD   AF SD SD SD   Neuro Re-Ed                    Self massage back buddy     3' R Utrap              Median n glide 5 hold x 10 5s 2x10 2x10 2x10 2x10 2 x 10 2x10 2x10 5" 2x10 5"   Scap retraction ER w TB Red 2x15 TB Red 2x15 TB RTB 2x15 Green 2x10 Green 3x10 Green 3x10 Green 2x15 Blue 3x10 TB Green 3x10 TB   Cervical retraction at wall with loop stabilization       2x5 yellow loop  2x5 yellow 2x5 yellow 2x5 yellow 2x8 yellow 2x8 yellow   Cervical retraction at wall with GH flexion with loop   2x10 yellow loop 2x10 YTB 3x10 yellow TB 3x10 yellow          Wall Balls (Circles)   30x ea 30x   20 ea dir bilat 20 ea dir bilat        Lower trap wall sets   2x10 2x10 2x10 yellow TB 2x15 yellow TB 2x15 yellow TB 2x15 yellow TB 2x10 medium TB 3x10 yellow TB   Ther Ex                    Pulleys 5"x30 5"x30 30x 30x 30x 30 30 30x  30x   Cane IR                    Towel gripping 20x                  Scaption AROM  2x10 3x10 w cervicalretraction at wall 3x10 w cervicalretraction at wall     3 x 10        TB LPD 2x10 green 2x15 green 3x10 GTB 2x15 green 2x15 blue 2x15 blue 2x15 blue 2x15 black 2x15 black   TB Row 2x10 blue 2x15 blue 3x10 Blue 2x15 blue 2x15 blue 2x15 blue 2x15 blue 2x15 black 2x15 black                     pec stretch             10s x5 10s x5 10s x 5               Cat/Camel 10x        Scap wall slides 10x 5s 5s 2x10   3x10   30        Open books             5s x10 ea B 5s x10 B 5s x 10 B               Thread the needle 3x ea side        Gait Training                                                              Modalities

## 2022-01-12 ENCOUNTER — OFFICE VISIT (OUTPATIENT)
Dept: PHYSICAL THERAPY | Facility: CLINIC | Age: 42
End: 2022-01-12
Payer: OTHER MISCELLANEOUS

## 2022-01-12 DIAGNOSIS — M25.511 ACUTE PAIN OF RIGHT SHOULDER: Primary | ICD-10-CM

## 2022-01-12 DIAGNOSIS — M54.2 NECK PAIN: ICD-10-CM

## 2022-01-12 PROCEDURE — 97110 THERAPEUTIC EXERCISES: CPT

## 2022-01-12 PROCEDURE — 97140 MANUAL THERAPY 1/> REGIONS: CPT

## 2022-01-12 PROCEDURE — 97112 NEUROMUSCULAR REEDUCATION: CPT

## 2022-01-12 NOTE — PROGRESS NOTES
Daily Note     Today's date: 2022  Patient name: Katina Lamar  : 1980  MRN: [id number]  Referring provider: No ref  provider found  Dx:   Encounter Diagnosis     ICD-10-CM    1  Acute pain of right shoulder  M25 511    2  Neck pain  M54 2                   Subjective: Patient reports no new complaints this visit  States his arm continues to get fatigued easily  Does feel his strength is improving  Objective: See treatment diary below      Assessment: Tolerated treatment well  Patient fatigued following lower trap wall sets, requested to lower TB resistance on B ER w scap retraction this visit  Otherwise, pt tolerated session well without adverse effects  Patient demonstrated fatigue post treatment, exhibited good technique with therapeutic exercises and would benefit from continued PT  Plan: Progress treatment as tolerated         PMH: HTN   Manuals 11/22 11/26 11/29 12/2 12/16 12/21 12/23 12/27 1/10 1/12   Manual cervical tx SD SD JK SD SD AF SD SD SD SD   Median n glide SD                   TPR     5' R Utrap SD   AF         UT Stretch       SD   AF SD SD SD SD   Neuro Re-Ed                     Self massage back buddy     3' R Utrap               Median n glide 5 hold x 10 5s 2x10 2x10 2x10 2x10 2 x 10 2x10 2x10 5" 2x10 5" 2x10 5"   Scap retraction ER w TB Red 2x15 TB Red 2x15 TB RTB 2x15 Green 2x10 Green 3x10 Green 3x10 Green 2x15 Blue 3x10 TB Green 3x10 TB Red 3x10 TB   Cervical retraction at wall with loop stabilization       2x5 yellow loop  2x5 yellow 2x5 yellow 2x5 yellow 2x8 yellow 2x8 yellow 2x8 yellow   Lower trap wall sets   2x10 2x10 2x10 yellow TB 2x15 yellow TB 2x15 yellow TB 2x15 yellow TB 2x10 medium TB 3x10 yellow TB 3x10 yellow TB   Ther Ex                     Pulleys 5"x30 5"x30 30x 30x 30x 30 30 30x  30x 30x   Cane IR                     Towel gripping 20x                   TB LPD 2x10 green 2x15 green 3x10 GTB 2x15 green 2x15 blue 2x15 blue 2x15 blue 2x15 black 2x15 black 2x20 black   TB Row 2x10 blue 2x15 blue 3x10 Blue 2x15 blue 2x15 blue 2x15 blue 2x15 blue 2x15 black 2x15 black 2x20 black                         pec stretch             10s x5 10s x5 10s x 5 10s x 5               Cat/Camel 10x         Scap wall slides 10x 5s 5s 2x10   3x10   30         Open books             5s x10 ea B 5s x10 B 5s x 10 B 5s x 10 B    TB Tricep press           Thread the needle 3x ea side      2x15 black   Gait Training                                                                 Modalities

## 2022-01-19 ENCOUNTER — OFFICE VISIT (OUTPATIENT)
Dept: PHYSICAL THERAPY | Facility: CLINIC | Age: 42
End: 2022-01-19
Payer: OTHER MISCELLANEOUS

## 2022-01-19 DIAGNOSIS — M54.2 NECK PAIN: ICD-10-CM

## 2022-01-19 DIAGNOSIS — M25.511 ACUTE PAIN OF RIGHT SHOULDER: Primary | ICD-10-CM

## 2022-01-19 PROCEDURE — 97140 MANUAL THERAPY 1/> REGIONS: CPT

## 2022-01-19 PROCEDURE — 97112 NEUROMUSCULAR REEDUCATION: CPT

## 2022-01-19 PROCEDURE — 97110 THERAPEUTIC EXERCISES: CPT

## 2022-01-19 NOTE — PROGRESS NOTES
Daily Note     Today's date: 2022  Patient name: Elizabeth Torres  : 1980  MRN: [id number]  Referring provider: No ref  provider found  Dx:   Encounter Diagnosis     ICD-10-CM    1  Acute pain of right shoulder  M25 511    2  Neck pain  M54 2                   Subjective: Patient reports tiredness in R arm this visit  States he did a lot of physical labor at work yesterday  Objective: See treatment diary below      Assessment: Tolerated treatment well  Patient tolerated strengthening program well with less fatigue and rest breaks required throughout  Patient demonstrated increased discomfort and soft tissue restriction to R UT this visit  Patient exhibited good technique with therapeutic exercises and would benefit from continued PT  Plan: Progress treatment as tolerated         PMH: HTN   Manuals 12/16 12/21 12/23 12/27 1/10 1/12 1/19   Manual cervical tx SD AF SD SD SD SD SD   Median n glide              TPR   AF          UT Stretch   AF SD SD SD SD SD   Neuro Re-Ed              Self massage back buddy              Median n glide 2x10 2 x 10 2x10 2x10 5" 2x10 5" 2x10 5" 2x10 5s hold   Scap retraction ER w TB Green 3x10 Green 3x10 Green 2x15 Blue 3x10 TB Green 3x10 TB Red 3x10 TB Green 3x10 TB   Cervical retraction at wall with loop stabilization 2x5 yellow 2x5 yellow 2x5 yellow 2x8 yellow 2x8 yellow 2x8 yellow 3x5 yellow    Lower trap wall sets 2x15 yellow TB 2x15 yellow TB 2x15 yellow TB 2x10 medium TB 3x10 yellow TB 3x10 yellow TB 2x15 yellow TB   Ther Ex              Pulleys 30x 30 30 30x  30x 30x 30x   Cane IR              Towel gripping              TB LPD 2x15 blue 2x15 blue 2x15 blue 2x15 black 2x15 black 2x20 black 2x20 black   TB Row 2x15 blue 2x15 blue 2x15 blue 2x15 black 2x15 black 2x20 black 2x20 black                  pec stretch     10s x5 10s x5 10s x 5 10s x 5 20s x3       Cat/Camel 10x          Scap wall slides   30          Open books     5s x10 ea B 5s x10 B 5s x 10 B 5s x 10 B 5s x10 B    TB Tricep press   Thread the needle 3x ea side      2x15 black 3s hold 2x15 black   Gait Training                                            Modalities

## 2022-01-24 ENCOUNTER — OFFICE VISIT (OUTPATIENT)
Dept: PHYSICAL THERAPY | Facility: CLINIC | Age: 42
End: 2022-01-24
Payer: OTHER MISCELLANEOUS

## 2022-01-24 DIAGNOSIS — M25.511 ACUTE PAIN OF RIGHT SHOULDER: Primary | ICD-10-CM

## 2022-01-24 DIAGNOSIS — M54.2 NECK PAIN: ICD-10-CM

## 2022-01-24 PROCEDURE — 97140 MANUAL THERAPY 1/> REGIONS: CPT

## 2022-01-24 PROCEDURE — 97112 NEUROMUSCULAR REEDUCATION: CPT

## 2022-01-24 PROCEDURE — 97110 THERAPEUTIC EXERCISES: CPT

## 2022-01-24 NOTE — PROGRESS NOTES
Daily Note     Today's date: 2022  Patient name: Shirin Corona  : 1980  MRN: [id number]  Referring provider: No ref  provider found  Dx:   Encounter Diagnosis     ICD-10-CM    1  Acute pain of right shoulder  M25 511    2  Neck pain  M54 2                   Subjective: Patient reports no new complaints  States he feel strength is gradually improving  Objective: See treatment diary below      Assessment: Tolerated treatment well  No adverse effects or increase in pain during or after session  Fatigue noted following strengthening exercises  Patient exhibited good technique with therapeutic exercises and would benefit from continued PT      Plan: Progress treatment as tolerated         PMH: HTN   Manuals 12/16 12/21 12/23 12/27 1/10 1/12 1/19 1/24   Manual cervical tx SD AF SD SD SD SD SD SD   Median n glide               TPR   AF           UT Stretch   AF SD SD SD SD SD SD   Neuro Re-Ed               Self massage back buddy               Median n glide 2x10 2 x 10 2x10 2x10 5" 2x10 5" 2x10 5" 2x10 5s hold 2x10 5s hold   Scap retraction ER w TB Green 3x10 Green 3x10 Green 2x15 Blue 3x10 TB Green 3x10 TB Red 3x10 TB Green 3x10 TB Green TB 3x10   Cervical retraction at wall with loop stabilization 2x5 yellow 2x5 yellow 2x5 yellow 2x8 yellow 2x8 yellow 2x8 yellow 3x5 yellow  3x5 yellow   Lower trap wall sets 2x15 yellow TB 2x15 yellow TB 2x15 yellow TB 2x10 medium TB 3x10 yellow TB 3x10 yellow TB 2x15 yellow TB 2x15 yellow TB   Ther Ex               Pulleys 30x 30 30 30x  30x 30x 30x 30x                   Sh  Flexion w TB loop at wrist                 TB LPD 2x15 blue 2x15 blue 2x15 blue 2x15 black 2x15 black 2x20 black 2x20 black 2x20 black   TB Row 2x15 blue 2x15 blue 2x15 blue 2x15 black 2x15 black 2x20 black 2x20 black 2x20 black                   pec stretch     10s x5 10s x5 10s x 5 10s x 5 20s x3 20s x 5       Cat/Camel 10x           Scap wall slides   30           Open books     5s x10 ea B 5s x10 B 5s x 10 B 5s x 10 B 5s x10 B 5s x10 B    TB Tricep press   Thread the needle 3x ea side      2x15 black 3s hold 2x15 black    Gait Training                                               Modalities

## 2022-01-26 ENCOUNTER — APPOINTMENT (OUTPATIENT)
Dept: PHYSICAL THERAPY | Facility: CLINIC | Age: 42
End: 2022-01-26
Payer: OTHER MISCELLANEOUS

## 2022-01-31 ENCOUNTER — OFFICE VISIT (OUTPATIENT)
Dept: PHYSICAL THERAPY | Facility: CLINIC | Age: 42
End: 2022-01-31
Payer: OTHER MISCELLANEOUS

## 2022-01-31 DIAGNOSIS — M25.511 ACUTE PAIN OF RIGHT SHOULDER: Primary | ICD-10-CM

## 2022-01-31 DIAGNOSIS — M54.2 NECK PAIN: ICD-10-CM

## 2022-01-31 PROCEDURE — 97140 MANUAL THERAPY 1/> REGIONS: CPT

## 2022-01-31 PROCEDURE — 97110 THERAPEUTIC EXERCISES: CPT

## 2022-01-31 PROCEDURE — 97112 NEUROMUSCULAR REEDUCATION: CPT

## 2022-01-31 NOTE — PROGRESS NOTES
Daily Note     Today's date: 2022  Patient name: Ashish Olivas  : 1980  MRN: [id number]  Referring provider: No ref  provider found  Dx:   Encounter Diagnosis     ICD-10-CM    1  Acute pain of right shoulder  M25 511    2  Neck pain  M54 2                   Subjective: Patient reports numbness and tingling has completely resolved  States he feels strength is improving  Objective: See treatment diary below      Assessment: Tolerated treatment well  Patient demonstrates good tolerance to strength progression, due did note fatigue following strengthening exercises  Patient exhibited good technique with therapeutic exercises and would benefit from continued PT  Plan: Progress treatment as tolerated         PMH: HTN   Manuals 12/16 12/21 12/23 12/27 1/10 1/12 1/19 1/24 1/31   Manual cervical tx SD AF SD SD SD SD SD SD SD   Median n glide                TPR   AF            UT Stretch   AF SD SD SD SD SD SD SD   Neuro Re-Ed                Self massage back buddy                Median n glide 2x10 2 x 10 2x10 2x10 5" 2x10 5" 2x10 5" 2x10 5s hold 2x10 5s hold 2x10 5s hold   Scap retraction ER w TB Green 3x10 Green 3x10 Green 2x15 Blue 3x10 TB Green 3x10 TB Red 3x10 TB Green 3x10 TB Green TB 3x10 Blue TB 3x10   Cervical retraction at wall with loop stabilization 2x5 yellow 2x5 yellow 2x5 yellow 2x8 yellow 2x8 yellow 2x8 yellow 3x5 yellow  3x5 yellow 3x5 yellow   Lower trap wall sets 2x15 yellow TB 2x15 yellow TB 2x15 yellow TB 2x10 medium TB 3x10 yellow TB 3x10 yellow TB 2x15 yellow TB 2x15 yellow TB 3x10 red TB   Ther Ex                Pulleys 30x 30 30 30x  30x 30x 30x 30x  30x                   Sh  Flexion w TB loop at wrist                  TB LPD 2x15 blue 2x15 blue 2x15 blue 2x15 black 2x15 black 2x20 black 2x20 black 2x20 black 2x20   TB Row 2x15 blue 2x15 blue 2x15 blue 2x15 black 2x15 black 2x20 black 2x20 black 2x20 black 2x20 black                    pec stretch     10s x5 10s x5 10s x 5 10s x 5 20s x3 20s x 5 20s x5       Cat/Camel 10x            Scap wall slides   30            Open books     5s x10 ea B 5s x10 B 5s x 10 B 5s x 10 B 5s x10 B 5s x10 B 5sx10 B    TB Tricep press   Thread the needle 3x ea side      2x15 black 3s hold 2x15 black     Gait Training                                                  Modalities

## 2022-02-07 NOTE — PROGRESS NOTES
Pt called and would like to be dc at this time due to lack of time with return to work  Therefore, pt to be DC at this time without formal re-evaluation

## 2022-08-23 ENCOUNTER — HOSPITAL ENCOUNTER (OUTPATIENT)
Dept: SLEEP CENTER | Facility: CLINIC | Age: 42
Discharge: HOME | End: 2022-08-23

## 2022-08-23 DIAGNOSIS — G47.33 OSA (OBSTRUCTIVE SLEEP APNEA): ICD-10-CM

## 2022-08-24 NOTE — PROGRESS NOTES
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 12PM   Caffeine use:YES- coffee  6 ounces and soda  12 ounces    Alcohol:Dayshift workers after 5PM: Alcohol use:NO    Typical day for patient:YES       Study Documentation    Sleep Study Indications: G47 [id number]    Sleep Study: Treatment   Optimal PAP pressure:   Leak:  Snore:  REM Obtained:  Supplemental O2:   Minimum SaO2   Baseline SaO2   PAP mask tried (list all)  PAP mask choice (final)  PAP mask type:  PAP pressure at which snoring was eliminated   Minimum SaO2 at final PAP pressure   Mode of Therapy:  ETCO2:  CPAP changed to BiPAP:    Mode of Therapy:    EKG abnormalities:      EEG abnormalities:     Sleep Study Recorded < 2 hours:     Sleep Study Recorded > 2 hours but incomplete study:     Sleep Study Recorded 6 hours but no sleep obtained:     Patient classification: employed   Pt  was unable to tolerate CPAP trying a variety of masks, complained of choking    Post-Sleep Study    Medication used at bedtime or during sleep study:    Patient reports time it took to fall asleep:    Patient reports waking up during study:    Patient reports sleeping     Patient reports sleep during study:    Patient rated sleepiness:     PAP treatment:

## 2023-02-27 NOTE — PROGRESS NOTES
Daily Note     Today's date: 2021  Patient name: Clara Bardales  : 1980  MRN: [id number]  Referring provider: Lorrie Vanegas DO  Dx:   Encounter Diagnosis     ICD-10-CM    1  Closed nondisplaced fracture of shaft of right clavicle, initial encounter  S42 024A                   Subjective: No significant pain just stiffness right shoulder      Objective: See treatment diary below      Assessment: Tolerated treatment well  He completed increase in reps as indicated  He has a good amount of tenderness and tightness in pec major and has some right arm radiculopathy with STM to the same  Plan: Continue per plan of care  Progress treatment as tolerated         Precautions:  Gentle ROM and strengthening      Specialty Daily Treatment Diary       Manuals 3/24/21 3/26/21 3/30/21 4/2/21 4/6/21   Visit # 1 2 3 4 5- foto   STM R UT, pec group  4' 4' 4' 4'   Gentle PROM R shld  6' 6' 6' 6'                   Warm-up        MH  10 min 10 min 5' 5'   Neuro Re-Ed        Posture correct  10 10 10 10   Scap squeeze  30 30 30 30   Gripping  30 30 30 30                   Ther Ex        Ball rolls  20 30 30 30   AROM flex elbow  20 30 30 30   AROM ER    20 20   Cane ABD        Cane flex  10 10 15 15   Cane ER     10   Bent row   10 20 25                   Ther Activity                                Modalities        CP  5' 5' 5' 2' PULMONARY REHAB ASSESSMENT    Today's date: 2023  Patient name: Jorge Romero     : 1967       MRN: [id number]  PCP: Danielle Choi MD  Referring Physician: Jaylyn Chino MD  Pulmonologist: Jaylyn Chino MD    Dx: J44 [id number]      Date of onset: 2023      Weight:    Wt Readings from Last 1 Encounters:   22 73 9 kg (163 lb)      Height:   Ht Readings from Last 1 Encounters:   22 5' 4 5" (1 638 m)     Medical History:   Past Medical History:   Diagnosis Date   • Chronic sinus complaints     last assessed 17   • Diverticulosis    • GERD (gastroesophageal reflux disease)     off medicine for past few years   • Heart attack [facility])     last assessed 17   • HTN (hypertension)    • Hyperlipidemia    • Sciatica    • Tobacco use    • Vasovagal syncope     last assessed 13   • Wears glasses          Physical Limitations: Recumbent bike    Oxygen needs: n/a    History of Toxic Exposure:  Chemicals  Dust  Vapors  Second hand smoke    Risk Factors   Cholesterol: Yes  Smoking: Former user  HTN: Yes  DM: No  Obesity: Yes   Inactivity: Yes  Stress:  perceived  stress: 3-4/10   Stressors: medical   Goals for Stress Management: hobby - build plastic models, drumming    Family History:  Family History   Problem Relation Age of Onset   • Heart disease Father         CABG   • Testicular cancer Father         adenocarcinoma in situ of the testis   • Heart defect Father         cardiac disorder   • Cancer Father    • Diabetes type II Father    • Heart disease Maternal Grandfather    • Hypertension Mother         benign essential   • Diabetes Sister    • Arthritis Family    • Arthritis Other    • Heart defect Other         cardiac disorder   • Diabetes Other    • Cancer Other        Allergies: Patient has no known allergies    ETOH:   Social History     Substance and Sexual Activity   Alcohol Use No    Comment: rarely; denied hx of social drinker as per [employer]         Current Medications: Current Outpatient Medications   Medication Sig Dispense Refill   • albuterol (PROVENTIL HFA,VENTOLIN HFA) 90 mcg/act inhaler INHALE TWO PUFFS BY MOUTH EVERY 4 HOURS AS NEEDED FOR WHEEZING OR SHORTNESS OF BREATH 18 g 7   • aspirin 325 mg tablet Take 1 tablet by mouth daily 100 tablet 0   • fluticasone (FLONASE) 50 mcg/act nasal spray 1 spray into each nostril daily 16 g 3   • roflumilast (DALIRESP) 500 mcg tablet Take 500 mcg by mouth daily     • rosuvastatin (CRESTOR) 20 MG tablet TAKE ONE TABLET BY MOUTH EVERY DAY 90 tablet 3   • tiotropium-olodaterol (STIOLTO RESPIMAT) 2 5-2 5 MCG/ACT inhaler Inhale 2 puffs daily       No current facility-administered medications for this visit             Functional Status Prior to Diagnosis for Treatment   Occupation: unemployed, disabled  Recreation: drumming  ADL’s: No limitations  Sully: No limitations  Exercise: walking on treadmill, light weights 4-5 days/week    Current Functional Status  Occupation: unemployed, disabled  Recreation: none  ADL’s: Capable of performing light to moderate ADLs  Sully: Capable of performing light to moderate ADLs  Exercise: none      Patient Specific Goals:  Return to walking on treadmill, improve shortness of breath, weight loss, consistent blood pressure, improve RYP score, improve fatigue and weakness    Short Term Program Goals: dietary modifications increased strength improved energy/stamina with ADLs exercise 120-150 mins/wk wt loss 1-2 ppw    Long Term Goals: increased maximal walking duration  increased intial training workload  Improved [facility] Activity Status score  Improved functional capacity  Improved Quality of Life - [facility] score reduced  Reduced waist circumference  improved Rate Your Plate Score    Oxygen Goals: Maintain SpO2>90% titrating supplemental oxygen as needed     Ability to reach goals/rehabilitation potential:  Very Good     Projected return to function: 8-12 weeks  Objective tests: 6 MWT      Nutritional   Reviewed details of Rate your Plate  Discussed key elements of heart healthy eating  Reviewed patient goals for dietary modifications and their clinical implications  Reviewed most recent lipid profile  Goals for dietary modification: eliminate processed meats  reduce portions of meat to 3 oz  increase fish intake  more meatless meals  reduced fat cheese  increase fruits and vegetables  more nuts/seeds  reduce sweets/frozen desserts      Emotional/Social  Patient reports he/she is coping well with good social support and denies depression or anxiety    SOCIAL SUPPORT NETWORK    Marital status: single      Domestic Violence Screening: No    Comments: Quit smoking 5-6 years ago, after heart attack  Diagnosed with COPD after heart attack   PFT shows lung function has not gotten worse

## 2023-06-16 ENCOUNTER — HOSPITAL ENCOUNTER (EMERGENCY)
Facility: HOSPITAL | Age: 43
Discharge: HOME | End: 2023-06-16
Attending: EMERGENCY MEDICINE
Payer: COMMERCIAL

## 2023-06-16 VITALS
SYSTOLIC BLOOD PRESSURE: 153 MMHG | DIASTOLIC BLOOD PRESSURE: 78 MMHG | TEMPERATURE: 98 F | HEART RATE: 83 BPM | OXYGEN SATURATION: 97 % | RESPIRATION RATE: 16 BRPM

## 2023-06-16 DIAGNOSIS — F31.13 BIPOLAR DISORDER, CURRENT EPISODE MANIC WITHOUT PSYCHOTIC FEATURES, SEVERE (HCC): Primary | ICD-10-CM

## 2023-06-16 LAB
ALBUMIN SERPL BCP-MCNC: 4.5 G/DL (ref 3.5–5)
ALP SERPL-CCNC: 109 U/L (ref 34–104)
ALT SERPL W P-5'-P-CCNC: 33 U/L (ref 7–52)
AMPHETAMINES SERPL QL SCN: NEGATIVE
ANION GAP SERPL CALCULATED.3IONS-SCNC: 7 MMOL/L (ref 4–13)
AST SERPL W P-5'-P-CCNC: 21 U/L (ref 13–39)
BARBITURATES UR QL: NEGATIVE
BASOPHILS # BLD AUTO: 0.08 THOUSANDS/ÂΜL (ref 0–0.1)
BASOPHILS NFR BLD AUTO: 1 % (ref 0–1)
BENZODIAZ UR QL: NEGATIVE
BILIRUB SERPL-MCNC: 0.47 MG/DL (ref 0.2–1)
BUN SERPL-MCNC: 19 MG/DL (ref 5–25)
CALCIUM SERPL-MCNC: 9 MG/DL (ref 8.4–10.2)
CHLORIDE SERPL-SCNC: 105 MMOL/L (ref 96–108)
CO2 SERPL-SCNC: 26 MMOL/L (ref 21–32)
COCAINE UR QL: NEGATIVE
CREAT SERPL-MCNC: 1.14 MG/DL (ref 0.6–1.3)
EOSINOPHIL # BLD AUTO: 0.24 THOUSAND/ÂΜL (ref 0–0.61)
EOSINOPHIL NFR BLD AUTO: 3 % (ref 0–6)
ERYTHROCYTE [DISTWIDTH] IN BLOOD BY AUTOMATED COUNT: 13.1 % (ref 11.6–15.1)
ETHANOL EXG-MCNC: 0 MG/DL
GFR SERPL CREATININE-BSD FRML MDRD: 79 ML/MIN/1.73SQ M
GLUCOSE SERPL-MCNC: 97 MG/DL (ref 65–140)
HCT VFR BLD AUTO: 37.9 % (ref 36.5–49.3)
HGB BLD-MCNC: 13.1 G/DL (ref 12–17)
IMM GRANULOCYTES # BLD AUTO: 0.03 THOUSAND/UL (ref 0–0.2)
IMM GRANULOCYTES NFR BLD AUTO: 0 % (ref 0–2)
LYMPHOCYTES # BLD AUTO: 2.57 THOUSANDS/ÂΜL (ref 0.6–4.47)
LYMPHOCYTES NFR BLD AUTO: 32 % (ref 14–44)
MCH RBC QN AUTO: 29.8 PG (ref 26.8–34.3)
MCHC RBC AUTO-ENTMCNC: 34.6 G/DL (ref 31.4–37.4)
MCV RBC AUTO: 86 FL (ref 82–98)
METHADONE UR QL: NEGATIVE
MONOCYTES # BLD AUTO: 0.75 THOUSAND/ÂΜL (ref 0.17–1.22)
MONOCYTES NFR BLD AUTO: 9 % (ref 4–12)
NEUTROPHILS # BLD AUTO: 4.43 THOUSANDS/ÂΜL (ref 1.85–7.62)
NEUTS SEG NFR BLD AUTO: 55 % (ref 43–75)
NRBC BLD AUTO-RTO: 0 /100 WBCS
OPIATES UR QL SCN: NEGATIVE
OXYCODONE+OXYMORPHONE UR QL SCN: NEGATIVE
PCP UR QL: NEGATIVE
PLATELET # BLD AUTO: 257 THOUSANDS/UL (ref 149–390)
PMV BLD AUTO: 9.6 FL (ref 8.9–12.7)
POTASSIUM SERPL-SCNC: 3.8 MMOL/L (ref 3.5–5.3)
PROT SERPL-MCNC: 7 G/DL (ref 6.4–8.4)
RBC # BLD AUTO: 4.4 MILLION/UL (ref 3.88–5.62)
SODIUM SERPL-SCNC: 138 MMOL/L (ref 135–147)
THC UR QL: NEGATIVE
TSH SERPL DL<=0.05 MIU/L-ACNC: 4.2 UIU/ML (ref 0.45–4.5)
WBC # BLD AUTO: 8.1 THOUSAND/UL (ref 4.31–10.16)

## 2023-06-16 PROCEDURE — 36415 COLL VENOUS BLD VENIPUNCTURE: CPT | Performed by: EMERGENCY MEDICINE

## 2023-06-16 PROCEDURE — 93005 ELECTROCARDIOGRAM TRACING: CPT

## 2023-06-16 PROCEDURE — 80307 DRUG TEST PRSMV CHEM ANLYZR: CPT | Performed by: EMERGENCY MEDICINE

## 2023-06-16 PROCEDURE — 85025 COMPLETE CBC W/AUTO DIFF WBC: CPT | Performed by: EMERGENCY MEDICINE

## 2023-06-16 PROCEDURE — 84443 ASSAY THYROID STIM HORMONE: CPT | Performed by: EMERGENCY MEDICINE

## 2023-06-16 PROCEDURE — 82075 ASSAY OF BREATH ETHANOL: CPT | Performed by: EMERGENCY MEDICINE

## 2023-06-16 PROCEDURE — 80053 COMPREHEN METABOLIC PANEL: CPT | Performed by: EMERGENCY MEDICINE

## 2023-06-16 PROCEDURE — 99283 EMERGENCY DEPT VISIT LOW MDM: CPT

## 2023-06-16 RX ORDER — BUSPIRONE HYDROCHLORIDE 10 MG/1
10 TABLET ORAL 3 TIMES DAILY
COMMUNITY

## 2023-06-16 RX ORDER — ARIPIPRAZOLE 10 MG/1
10 TABLET ORAL DAILY
COMMUNITY

## 2023-06-16 RX ORDER — OMEPRAZOLE 10 MG/1
20 CAPSULE, DELAYED RELEASE ORAL DAILY
COMMUNITY

## 2023-06-16 RX ORDER — ASPIRIN 81 MG/1
81 TABLET, CHEWABLE ORAL DAILY
COMMUNITY

## 2023-06-16 RX ORDER — LISINOPRIL 10 MG/1
10 TABLET ORAL DAILY
COMMUNITY

## 2023-06-16 RX ORDER — BENZTROPINE MESYLATE 0.5 MG/1
0.5 TABLET ORAL 2 TIMES DAILY
COMMUNITY

## 2023-06-17 NOTE — ED PROVIDER NOTES
History  Chief Complaint   Patient presents with   • Psychiatric Evaluation     Pt states he takes lexapro, abilify and buspar for his bipolar  States his family says he is having manic episodes  Denies SI/HI     HPI     71-year-old male presenting to the emergency department with essie  Past medical history significant for bipolar disorder, hypercholesterolemia, hypertension  Patient reports that over the past several days, he has had manic manifestations, though denies Rene ideation, homicidal ideation, hallucinations  Last weeks prior to this, patient had passive suicidal ideation  Patient reports that he has been having some changes to his psychiatric medications, but he does not feel that these changes have been helping symptoms  Patient is presenting to the ED for help in further adjustments if possible recently  Denies any recent history of systemic illness including fever, chest pain, shortness of breath, nausea, vomiting, abdominal pain, urinary symptoms, changes in stool  Prior to Admission Medications   Prescriptions Last Dose Informant Patient Reported? Taking?    ARIPiprazole (ABILIFY) 10 mg tablet 6/16/2023  Yes Yes   Sig: Take 10 mg by mouth daily   amLODIPine (NORVASC) 2 5 mg tablet 6/16/2023 Self Yes Yes   Sig: Take 10 mg by mouth daily Pt unsure of dose at this time lowest dose used   aspirin 81 mg chewable tablet 6/16/2023  Yes Yes   Sig: Chew 81 mg daily   atorvastatin (LIPITOR) 20 mg tablet 6/16/2023 Self Yes Yes   Sig: Take 40 mg by mouth daily   benztropine (COGENTIN) 0 5 mg tablet 6/16/2023  Yes Yes   Sig: Take 0 5 mg by mouth 2 (two) times a day   busPIRone (BUSPAR) 10 mg tablet 6/16/2023  Yes Yes   Sig: Take 10 mg by mouth 3 (three) times a day   escitalopram (LEXAPRO) 20 mg tablet 6/16/2023 Self Yes Yes   Sig: Take 20 mg by mouth daily   lisinopril (ZESTRIL) 10 mg tablet 6/16/2023  Yes Yes   Sig: Take 10 mg by mouth daily   omeprazole (PriLOSEC) 10 mg delayed release capsule 6/16/2023  Yes Yes   Sig: Take 20 mg by mouth daily      Facility-Administered Medications: None       Past Medical History:   Diagnosis Date   • Anxiety    • Bipolar affective disorder, manic ([facility] [address] )    • Hypercholesteremia    • Hypertension        History reviewed  No pertinent surgical history  Family History   Problem Relation Age of Onset   • No Known Problems Mother    • No Known Problems Father      I have reviewed and agree with the history as documented  E-Cigarette/Vaping   • E-Cigarette Use Never User      E-Cigarette/Vaping Substances     Social History     Tobacco Use   • Smoking status: Former     Packs/day: 0 25     Types: Cigarettes   • Smokeless tobacco: Never   Vaping Use   • Vaping Use: Never used   Substance Use Topics   • Alcohol use: Not Currently     Comment: Occasional    • Drug use: Never     Comment: Illicit drugs:   none  - As per Anny        Review of Systems   Constitutional: Negative for activity change, chills and fever  HENT: Negative for sneezing and sore throat  Eyes: Negative for pain  Respiratory: Negative for apnea, cough, choking, chest tightness, shortness of breath, wheezing and stridor  Cardiovascular: Negative for chest pain, palpitations and leg swelling  Gastrointestinal: Negative for abdominal distention, abdominal pain, anal bleeding, blood in stool, constipation, diarrhea, nausea, rectal pain and vomiting  Genitourinary: Negative for dysuria and hematuria  Musculoskeletal: Negative for back pain, gait problem, myalgias, neck pain and neck stiffness  Skin: Negative for color change, pallor, rash and wound  Neurological: Negative for dizziness, tremors, seizures, syncope, facial asymmetry, speech difficulty, weakness, light-headedness, numbness and headaches  Psychiatric/Behavioral: Negative for suicidal ideas  Kay       Physical Exam  Physical Exam  Vitals and nursing note reviewed     Constitutional:       General: He is not in acute distress  Appearance: He is well-developed  He is not ill-appearing, toxic-appearing or diaphoretic  HENT:      Head: Normocephalic and atraumatic  Right Ear: External ear normal       Left Ear: External ear normal       Nose: Nose normal       Mouth/Throat:      Mouth: Mucous membranes are moist    Eyes:      General:         Right eye: No discharge  Left eye: No discharge  Conjunctiva/sclera: Conjunctivae normal       Pupils: Pupils are equal, round, and reactive to light  Cardiovascular:      Rate and Rhythm: Normal rate and regular rhythm  Heart sounds: Normal heart sounds  No friction rub  No gallop  Pulmonary:      Effort: Pulmonary effort is normal  No respiratory distress  Breath sounds: Normal breath sounds  No stridor  No wheezing or rales  Chest:      Chest wall: No tenderness  Abdominal:      General: Bowel sounds are normal       Palpations: Abdomen is soft  Tenderness: There is no abdominal tenderness  There is no guarding or rebound  Musculoskeletal:         General: No tenderness or deformity  Normal range of motion  Cervical back: Normal range of motion and neck supple  Right lower leg: No edema  Left lower leg: No edema  Skin:     General: Skin is warm and dry  Capillary Refill: Capillary refill takes less than 2 seconds  Neurological:      Mental Status: He is alert and oriented to person, place, and time           Vital Signs  ED Triage Vitals [06/16/23 2047]   Temperature Pulse Respirations Blood Pressure SpO2   98 °F (36 7 °C) 95 16 153/78 97 %      Temp Source Heart Rate Source Patient Position - Orthostatic VS BP Location FiO2 (%)   Oral Monitor Sitting Right arm --      Pain Score       --           Vitals:    06/16/23 2047 06/16/23 2215   BP: 153/78    Pulse: 95 83   Patient Position - Orthostatic VS: Sitting          Visual Acuity      ED Medications  Medications - No data to display    Diagnostic Studies  Results Reviewed     Procedure Component Value Units Date/Time    TSH, 3rd generation with Free T4 reflex [[id number]]  (Normal) Collected: 06/16/23 2200    Lab Status: Final result Specimen: Blood from Arm, Left Updated: 06/16/23 2245     TSH 3RD GENERATON 4 203 uIU/mL     Rapid drug screen, urine [[id number]]  (Normal) Collected: 06/16/23 2200    Lab Status: Final result Specimen: Urine, Clean Catch Updated: 06/16/23 2232     Amph/Meth UR Negative     Barbiturate Ur Negative     Benzodiazepine Urine Negative     Cocaine Urine Negative     Methadone Urine Negative     Opiate Urine Negative     PCP Ur Negative     THC Urine Negative     Oxycodone Urine Negative    Narrative:      FOR MEDICAL PURPOSES ONLY  IF CONFIRMATION NEEDED PLEASE CONTACT THE LAB WITHIN 5 DAYS      Drug Screen Cutoff Levels:  AMPHETAMINE/METHAMPHETAMINES  1000 ng/mL  BARBITURATES     200 ng/mL  BENZODIAZEPINES     200 ng/mL  COCAINE      300 ng/mL  METHADONE      300 ng/mL  OPIATES      300 ng/mL  PHENCYCLIDINE     25 ng/mL  THC       50 ng/mL  OXYCODONE      100 ng/mL    Comprehensive metabolic panel [[id number]]  (Abnormal) Collected: 06/16/23 2200    Lab Status: Final result Specimen: Blood from Arm, Left Updated: 06/16/23 2231     Sodium 138 mmol/L      Potassium 3 8 mmol/L      Chloride 105 mmol/L      CO2 26 mmol/L      ANION GAP 7 mmol/L      BUN 19 mg/dL      Creatinine 1 14 mg/dL      Glucose 97 mg/dL      Calcium 9 0 mg/dL      AST 21 U/L      ALT 33 U/L      Alkaline Phosphatase 109 U/L      Total Protein 7 0 g/dL      Albumin 4 5 g/dL      Total Bilirubin 0 47 mg/dL      eGFR 79 ml/min/1 73sq m     Narrative:      [facility] guidelines for Chronic Kidney Disease (CKD):   •  Stage 1 with normal or high GFR (GFR > 90 mL/min/1 73 square meters)  •  Stage 2 Mild CKD (GFR = 60-89 mL/min/1 73 square meters)  •  Stage 3A Moderate CKD (GFR = 45-59 mL/min/1 73 square meters)  •  Stage 3B Moderate CKD (GFR = 30-44 mL/min/1 73 square meters)  •  Stage 4 Severe CKD (GFR = 15-29 mL/min/1 73 square meters)  •  Stage 5 End Stage CKD (GFR <15 mL/min/1 73 square meters)  Note: GFR calculation is accurate only with a steady state creatinine    CBC and differential [[id number]] Collected: 06/16/23 2200    Lab Status: Final result Specimen: Blood from Arm, Left Updated: 06/16/23 2212     WBC 8 10 Thousand/uL      RBC 4 40 Million/uL      Hemoglobin 13 1 g/dL      Hematocrit 37 9 %      MCV 86 fL      MCH 29 8 pg      MCHC 34 6 g/dL      RDW 13 1 %      MPV 9 6 fL      Platelets 794 Thousands/uL      nRBC 0 /100 WBCs      Neutrophils Relative 55 %      Immat GRANS % 0 %      Lymphocytes Relative 32 %      Monocytes Relative 9 %      Eosinophils Relative 3 %      Basophils Relative 1 %      Neutrophils Absolute 4 43 Thousands/µL      Immature Grans Absolute 0 03 Thousand/uL      Lymphocytes Absolute 2 57 Thousands/µL      Monocytes Absolute 0 75 Thousand/µL      Eosinophils Absolute 0 24 Thousand/µL      Basophils Absolute 0 08 Thousands/µL     POCT alcohol breath test [[id number]]  (Normal) Resulted: 06/16/23 2149    Lab Status: Final result Updated: 06/16/23 2150     EXTBreath Alcohol 0                 No orders to display              Procedures  Procedures         ED Course  ED Course as of 06/17/23 0036   Fri Jun 16, 2023   2321 Psychiatry and crisis are evaluating patient at this time  Patient is currently medically cleared for evaluations  At this time, patient does not meet criteria for inpatient psychiatric hold involuntary or voluntary  I relayed to the patient that we do not do medication adjustments in the emergency department  Crisis provided resources for the patient  Strict return precautions given  Upon discharge, patient was fully alert, oriented, GCS 15, ambulatory, without further complaints  SBIRT 22yo+    Flowsheet Row Most Recent Value   Initial Alcohol Screen: US AUDIT-C     1   How often do you have a drink containing alcohol? 6 Filed at: 06/16/2023 2103   2  How many drinks containing alcohol do you have on a typical day you are drinking? 2 Filed at: 06/16/2023 2103   3a  Male UNDER 65: How often do you have five or more drinks on one occasion? 6 Filed at: 06/16/2023 2103   3b  FEMALE Any Age, or MALE 65+: How often do you have 4 or more drinks on one occassion? 0 Filed at: 06/16/2023 2103   Audit-C Score 14 Filed at: 06/16/2023 2103   Full Alcohol Screen: US AUDIT    4  How often during the last year have you found that you were not able to stop drinking once you had started? 3 Filed at: 06/16/2023 2103   5  How often during past year have you failed to do what was normally expected of you because of drinking? 0 Filed at: 06/16/2023 2103   6  How often in past year have you needed a first drink in the morning to get yourself going after a heavy drinking session? 0 Filed at: 06/16/2023 2103   7  How often in past year have you had feeling of guilt or remorse after drinking? 4  [the past two nights] Filed at: 06/16/2023 2103   8  How often in past year have you been unable to remember what happened night before because you had been drinking? 0 Filed at: 06/16/2023 2103   9  Have you or someone else been injured as a result of your drinking? 0 Filed at: 06/16/2023 2103   10  Has a relative, friend, doctor or other health worker been concerned about your drinking and suggested you cut down? 4 Filed at: 06/16/2023 2103   AUDIT Total Score 25 Filed at: 06/16/2023 2103   PADMA: How many times in the past year have you    Used an illegal drug or used a prescription medication for non-medical reasons? Once or Twice  [2015, cocaine] Filed at: 06/16/2023 2103   DAST-10: In the past 12 months       1  Have you used drugs other than those required for medical reasons? 1 Filed at: 06/16/2023 2103   2  Do you use more than one drug at a time? 0 Filed at: 06/16/2023 7706   3   Have you had medical problems as a "result of your drug use (e g , memory loss, hepatitis, convulsions, bleeding, etc )? 0 Filed at: 06/16/2023 2103   4  Have you had \"blackouts\" or \"flashbacks\" as a result of drug use? YesNo 1 Filed at: 06/16/2023 2103   5  Do you ever feel bad or guilty about your drug use? 0 Filed at: 06/16/2023 2103   6  Does your spouse (or parent) ever complain about your involvement with drugs? 0 Filed at: 06/16/2023 2103   7  Have you neglected your family because of your use of drugs? 0 Filed at: 06/16/2023 2103   8  Have you engaged in illegal activities in order to obtain drugs? 0 Filed at: 06/16/2023 2103   9  Have you ever experienced withdrawal symptoms (felt sick) when you stopped taking drugs? 0 Filed at: 06/16/2023 2103   10  Are you always able to stop using drugs when you want to? 0 Filed at: 06/16/2023 2103   DAST-10 Score 2 Filed at: 06/16/2023 2103                    MDM    Disposition  Final diagnoses:   Bipolar disorder, current episode manic without psychotic features, severe ([address] )     Time reflects when diagnosis was documented in both MDM as applicable and the Disposition within this note     Time User Action Codes Description Comment    6/16/2023 10:49 PM Krunal Nieves [F31 13] Bipolar disorder, current episode manic without psychotic features, severe [facility])       ED Disposition     ED Disposition   Discharge    Condition   Stable    Date/Time   Fri Jun 16, 2023 2341    [address] [place] [address] discharge to home/self care  Follow-up Information     Follow up With Specialties Details Why Maricruz Acosta MD Internal Medicine Schedule an appointment as soon as possible for a visit in 1 week As needed, If symptoms worsen [address] [place] [address]  [address] [facility] [address]  Call today  Get online care: [employer] com  Address: [address] [place] [address] [address], [address]  Phone: [phone]          Discharge Medication List as of 6/16/2023 11:43 PM      CONTINUE these " medications which have NOT CHANGED    Details   amLODIPine (NORVASC) 2 5 mg tablet Take 10 mg by mouth daily Pt unsure of dose at this time lowest dose used, Historical Med      ARIPiprazole (ABILIFY) 10 mg tablet Take 10 mg by mouth daily, Historical Med      aspirin 81 mg chewable tablet Chew 81 mg daily, Historical Med      atorvastatin (LIPITOR) 20 mg tablet Take 40 mg by mouth daily, Historical Med      benztropine (COGENTIN) 0 5 mg tablet Take 0 5 mg by mouth 2 (two) times a day, Historical Med      busPIRone (BUSPAR) 10 mg tablet Take 10 mg by mouth 3 (three) times a day, Historical Med      escitalopram (LEXAPRO) 20 mg tablet Take 20 mg by mouth daily, Historical Med      lisinopril (ZESTRIL) 10 mg tablet Take 10 mg by mouth daily, Historical Med      omeprazole (PriLOSEC) 10 mg delayed release capsule Take 20 mg by mouth daily, Historical Med             No discharge procedures on file      PDMP Review       Value Time User    PDMP Reviewed  Yes 4/15/2021 10:44 AM Nikolay Andrew DO          ED Provider  Electronically Signed by           Gianni Small MD  06/17/23 2885

## 2023-06-17 NOTE — ED NOTES
Crisis met with pt to complete Crisis Intake and Safety Risk Assessment  Introduce self, role, and evaluation process  Pt is a 40year old male with a history of Bipolar, and Depression, history of alcohol use who presents in the ED for a psychiatric evaluation  Pt states for the past few days he has been having manic episodes; racing thoughts, irritability, sleeping 10 hours at nigh, then 5 hours during the day  Pt states these symptoms are due to the side effects from his psychotropic medications  Pt is prescribed Buspar, Abilify, and Lexapro  Pt is on a waiting list for psychiatric services with [facility]  He states his next psych appointment in [address] [place] [address] is in 8/23 and has been unsuccessful to schedule a psych appointment for an earlier date  Pt states he drinks alcohol daily; preference is beer or vodka  Pt denies suicidal, homicidal ideations, auditory, visual hallucinations or thoughts to self harm  Crisis and pt discussed recommendations to speak with a [facility] Psychiatrist via tele psych for further evaluation  Pt is agreeable

## 2023-06-17 NOTE — ED NOTES
Telephone call to [facility] [phone] for a tele psych evaluation  Spoke with Erik Riley and provided needed information  Pt is in queue

## 2023-06-17 NOTE — ED NOTES
This writer discussed the patients current presentation and recommended discharge plan with Dr Bennett  They agree with the patient being discharged at this time with referrals and/or information about Outpatient Services  The patient was Instructed to follow up with their pcp   The patient was provided with referral information for:   [facility] in [facility] for medication review and management  The patient was instructed to call local [facility] crisis, other crisis services, 911 or to go to the nearest ER immediately if their situation changes at any time  This writer discussed discharge plans with the patient who agrees with and understands the discharge plans

## 2023-06-17 NOTE — DISCHARGE INSTRUCTIONS
[facility] for [facility] sooner appointment in terms of seeing a psychiatrist to adjust your medications  If you have feelings of wanting to harm yourself or others, please return to the emergency department

## 2023-06-19 LAB
ATRIAL RATE: 88 BPM
P AXIS: 40 DEGREES
PR INTERVAL: 158 MS
QRS AXIS: 48 DEGREES
QRSD INTERVAL: 94 MS
QT INTERVAL: 372 MS
QTC INTERVAL: 450 MS
T WAVE AXIS: -3 DEGREES
VENTRICULAR RATE: 88 BPM

## 2023-06-19 PROCEDURE — 93010 ELECTROCARDIOGRAM REPORT: CPT | Performed by: INTERNAL MEDICINE

## 2023-07-07 ENCOUNTER — TELEPHONE (OUTPATIENT)
Dept: PSYCHIATRY | Facility: CLINIC | Age: 43
End: 2023-07-07

## 2023-07-07 NOTE — TELEPHONE ENCOUNTER
Attempted to call patient to schedule np apt for med management, no answer and vm not set up and unable to leave message

## 2024-01-29 ENCOUNTER — APPOINTMENT (EMERGENCY)
Dept: RADIOLOGY | Facility: HOSPITAL | Age: 44
End: 2024-01-29
Payer: COMMERCIAL

## 2024-01-29 ENCOUNTER — HOSPITAL ENCOUNTER (EMERGENCY)
Facility: HOSPITAL | Age: 44
Discharge: HOME | End: 2024-01-29
Attending: EMERGENCY MEDICINE
Payer: COMMERCIAL

## 2024-01-29 VITALS
DIASTOLIC BLOOD PRESSURE: 97 MMHG | HEART RATE: 109 BPM | BODY MASS INDEX: 35.07 KG/M2 | WEIGHT: 245 LBS | HEIGHT: 70 IN | RESPIRATION RATE: 18 BRPM | TEMPERATURE: 97 F | SYSTOLIC BLOOD PRESSURE: 157 MMHG | OXYGEN SATURATION: 95 %

## 2024-01-29 DIAGNOSIS — F31.9 BIPOLAR DISORDER (HCC): Primary | ICD-10-CM

## 2024-01-29 LAB
ALBUMIN SERPL BCP-MCNC: 4.7 G/DL (ref 3.5–5)
ALP SERPL-CCNC: 111 U/L (ref 34–104)
ALT SERPL W P-5'-P-CCNC: 42 U/L (ref 7–52)
AMPHETAMINES SERPL QL SCN: NEGATIVE
ANION GAP SERPL CALCULATED.3IONS-SCNC: 8 MMOL/L
AST SERPL W P-5'-P-CCNC: 20 U/L (ref 13–39)
BARBITURATES UR QL: NEGATIVE
BASOPHILS # BLD AUTO: 0.08 THOUSANDS/ÂΜL (ref 0–0.1)
BASOPHILS NFR BLD AUTO: 1 % (ref 0–1)
BENZODIAZ UR QL: NEGATIVE
BILIRUB SERPL-MCNC: 0.35 MG/DL (ref 0.2–1)
BILIRUB UR QL STRIP: NEGATIVE
BUN SERPL-MCNC: 14 MG/DL (ref 5–25)
CALCIUM SERPL-MCNC: 8.9 MG/DL (ref 8.4–10.2)
CHLORIDE SERPL-SCNC: 105 MMOL/L (ref 96–108)
CLARITY UR: CLEAR
CO2 SERPL-SCNC: 26 MMOL/L (ref 21–32)
COCAINE UR QL: NEGATIVE
COLOR UR: YELLOW
CREAT SERPL-MCNC: 0.98 MG/DL (ref 0.6–1.3)
EOSINOPHIL # BLD AUTO: 0.19 THOUSAND/ÂΜL (ref 0–0.61)
EOSINOPHIL NFR BLD AUTO: 3 % (ref 0–6)
ERYTHROCYTE [DISTWIDTH] IN BLOOD BY AUTOMATED COUNT: 12.9 % (ref 11.6–15.1)
ETHANOL SERPL-MCNC: <10 MG/DL
GFR SERPL CREATININE-BSD FRML MDRD: 94 ML/MIN/1.73SQ M
GLUCOSE SERPL-MCNC: 156 MG/DL (ref 65–140)
GLUCOSE UR STRIP-MCNC: NEGATIVE MG/DL
HCT VFR BLD AUTO: 39.3 % (ref 36.5–49.3)
HGB BLD-MCNC: 14.2 G/DL (ref 12–17)
HGB UR QL STRIP.AUTO: NEGATIVE
IMM GRANULOCYTES # BLD AUTO: 0.03 THOUSAND/UL (ref 0–0.2)
IMM GRANULOCYTES NFR BLD AUTO: 0 % (ref 0–2)
KETONES UR STRIP-MCNC: NEGATIVE MG/DL
LEUKOCYTE ESTERASE UR QL STRIP: NEGATIVE
LYMPHOCYTES # BLD AUTO: 2.01 THOUSANDS/ÂΜL (ref 0.6–4.47)
LYMPHOCYTES NFR BLD AUTO: 28 % (ref 14–44)
MCH RBC QN AUTO: 30.7 PG (ref 26.8–34.3)
MCHC RBC AUTO-ENTMCNC: 36.1 G/DL (ref 31.4–37.4)
MCV RBC AUTO: 85 FL (ref 82–98)
METHADONE UR QL: NEGATIVE
MONOCYTES # BLD AUTO: 0.46 THOUSAND/ÂΜL (ref 0.17–1.22)
MONOCYTES NFR BLD AUTO: 6 % (ref 4–12)
NEUTROPHILS # BLD AUTO: 4.49 THOUSANDS/ÂΜL (ref 1.85–7.62)
NEUTS SEG NFR BLD AUTO: 62 % (ref 43–75)
NITRITE UR QL STRIP: NEGATIVE
NRBC BLD AUTO-RTO: 0 /100 WBCS
OPIATES UR QL SCN: NEGATIVE
OXYCODONE+OXYMORPHONE UR QL SCN: NEGATIVE
PCP UR QL: NEGATIVE
PH UR STRIP.AUTO: 6 [PH]
PLATELET # BLD AUTO: 241 THOUSANDS/UL (ref 149–390)
PMV BLD AUTO: 9.2 FL (ref 8.9–12.7)
POTASSIUM SERPL-SCNC: 3.6 MMOL/L (ref 3.5–5.3)
PROT SERPL-MCNC: 7.1 G/DL (ref 6.4–8.4)
PROT UR STRIP-MCNC: NEGATIVE MG/DL
RBC # BLD AUTO: 4.62 MILLION/UL (ref 3.88–5.62)
SODIUM SERPL-SCNC: 139 MMOL/L (ref 135–147)
SP GR UR STRIP.AUTO: 1.02 (ref 1–1.03)
THC UR QL: NEGATIVE
UROBILINOGEN UR QL STRIP.AUTO: 0.2 E.U./DL
WBC # BLD AUTO: 7.26 THOUSAND/UL (ref 4.31–10.16)

## 2024-01-29 PROCEDURE — 82077 ASSAY SPEC XCP UR&BREATH IA: CPT | Performed by: EMERGENCY MEDICINE

## 2024-01-29 PROCEDURE — 81003 URINALYSIS AUTO W/O SCOPE: CPT | Performed by: EMERGENCY MEDICINE

## 2024-01-29 PROCEDURE — 99283 EMERGENCY DEPT VISIT LOW MDM: CPT

## 2024-01-29 PROCEDURE — 93005 ELECTROCARDIOGRAM TRACING: CPT

## 2024-01-29 PROCEDURE — 80307 DRUG TEST PRSMV CHEM ANLYZR: CPT | Performed by: EMERGENCY MEDICINE

## 2024-01-29 PROCEDURE — 85025 COMPLETE CBC W/AUTO DIFF WBC: CPT | Performed by: EMERGENCY MEDICINE

## 2024-01-29 PROCEDURE — 36415 COLL VENOUS BLD VENIPUNCTURE: CPT | Performed by: EMERGENCY MEDICINE

## 2024-01-29 PROCEDURE — 99285 EMERGENCY DEPT VISIT HI MDM: CPT | Performed by: EMERGENCY MEDICINE

## 2024-01-29 PROCEDURE — 80053 COMPREHEN METABOLIC PANEL: CPT | Performed by: EMERGENCY MEDICINE

## 2024-01-29 PROCEDURE — 71045 X-RAY EXAM CHEST 1 VIEW: CPT
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
## 2024-01-29 NOTE — ED NOTES
"44 yo SWSREE presents to ER with his GF's mother who recommended to patient - \"go inpt\" - patient is here due to having a \"mental crisis - racing thoughts and impulse control issues - I can't control my big mouth ...haven't driven in 2 weeks - I don't trust myself and unable to make decisions\".  Te patient is not known to [place] [facility] but is known to [place].  Stressors: \"relationship issues with GF - related to my impulse control issues - this has created distance between us\".  Mood = \"scared\" now; prior to ER = \"uptight and nervous\"; mood is stable but feeling [place] - irritable throughout the day\".  Symptoms include: increased sleep to 8-9 hours plus naps - patient feels he is \"sleeping to much\"; poor self esteem; increased \"racing thoughts\"; anxiety - \"2 - 3 x's per week - can't sit still; increased heart rate; tap my foot; \"a little bit paranoid about my GF leaving me\"; etoh hx is positive - \"I was an alcoholic - I have 6 months sobriety now\".  The patient denies: all lethality; psychosis; any change with appetite/energy/concentration; hopelessness; anhedonia or drug use.    The patient reported his family feels he is in need of inpt tx and he is willing to try that; next appt with psychiatrist was scheduled for 2/8/24.  "

## 2024-01-29 NOTE — Clinical Note
Moises Valdez should be transferred out to [facility] and has been medically cleared.

## 2024-01-29 NOTE — ED NOTES
Called [facility] - no [facility] beds tonight.    Called [place] [phone] then gave # [phone]; fax chart to [phone] - benefits # [phone]. Online - [email or web address] - spoke with Bald[place]ar - only hospitals found were [facility] and [facility] - stated they would sent PES a list but over the phone - the [place] hospitals PES typically uses were not in network.  The fax from insurance company was never received.    18:50 - called [facility] for bed availability - no beds tonight but expecting d/c's tomorrow.    19:10 - PES explained to patient there were no available beds tonight at in network facilities and he told PES he wanted to go home and would ask about IOP level of care @ [facility].  Patient was given a list of IOP/PHP's to call for tx.

## 2024-01-30 ENCOUNTER — HOSPITAL ENCOUNTER (EMERGENCY)
Facility: HOSPITAL | Age: 44
End: 2024-01-31
Attending: EMERGENCY MEDICINE
Payer: COMMERCIAL

## 2024-01-30 DIAGNOSIS — Z00.8 ENCOUNTER FOR PSYCHOLOGICAL EVALUATION: ICD-10-CM

## 2024-01-30 DIAGNOSIS — F31.9 BIPOLAR DISORDER (HCC): Primary | ICD-10-CM

## 2024-01-30 LAB
AMPHETAMINES SERPL QL SCN: NEGATIVE
ATRIAL RATE: 100 BPM
BARBITURATES UR QL: NEGATIVE
BENZODIAZ UR QL: NEGATIVE
COCAINE UR QL: NEGATIVE
ETHANOL EXG-MCNC: 0 MG/DL
METHADONE UR QL: NEGATIVE
OPIATES UR QL SCN: NEGATIVE
OXYCODONE+OXYMORPHONE UR QL SCN: NEGATIVE
P AXIS: 15 DEGREES
PCP UR QL: NEGATIVE
PR INTERVAL: 152 MS
QRS AXIS: 17 DEGREES
QRSD INTERVAL: 82 MS
QT INTERVAL: 336 MS
QTC INTERVAL: 433 MS
T WAVE AXIS: 17 DEGREES
THC UR QL: NEGATIVE
VENTRICULAR RATE: 100 BPM

## 2024-01-30 PROCEDURE — 99284 EMERGENCY DEPT VISIT MOD MDM: CPT | Performed by: EMERGENCY MEDICINE

## 2024-01-30 PROCEDURE — 80307 DRUG TEST PRSMV CHEM ANLYZR: CPT | Performed by: EMERGENCY MEDICINE

## 2024-01-30 PROCEDURE — 93005 ELECTROCARDIOGRAM TRACING: CPT

## 2024-01-30 PROCEDURE — 82075 ASSAY OF BREATH ETHANOL: CPT | Performed by: EMERGENCY MEDICINE

## 2024-01-30 PROCEDURE — 99283 EMERGENCY DEPT VISIT LOW MDM: CPT

## 2024-01-30 RX ORDER — OXCARBAZEPINE 300 MG/1
300 TABLET, FILM COATED ORAL EVERY 12 HOURS SCHEDULED
COMMUNITY
End: 2024-02-06

## 2024-01-30 RX ORDER — BUSPIRONE HYDROCHLORIDE 10 MG/1
30 TABLET ORAL 2 TIMES DAILY
Status: DISCONTINUED | OUTPATIENT
Start: 2024-01-30 | End: 2024-01-31 | Stop reason: HOSPADM

## 2024-01-30 RX ORDER — ASPIRIN 81 MG/1
81 TABLET, CHEWABLE ORAL DAILY
Status: DISCONTINUED | OUTPATIENT
Start: 2024-01-31 | End: 2024-01-31 | Stop reason: HOSPADM

## 2024-01-30 RX ORDER — PANTOPRAZOLE SODIUM 20 MG/1
20 TABLET, DELAYED RELEASE ORAL
Status: DISCONTINUED | OUTPATIENT
Start: 2024-01-31 | End: 2024-01-31 | Stop reason: HOSPADM

## 2024-01-30 RX ORDER — ATORVASTATIN CALCIUM 40 MG/1
40 TABLET, FILM COATED ORAL
Status: DISCONTINUED | OUTPATIENT
Start: 2024-01-31 | End: 2024-01-31 | Stop reason: HOSPADM

## 2024-01-30 RX ORDER — VILAZODONE HYDROCHLORIDE 20 MG/1
20 TABLET ORAL
COMMUNITY
End: 2024-02-06

## 2024-01-30 RX ORDER — ESCITALOPRAM OXALATE 5 MG/1
2.5 TABLET ORAL DAILY
Status: DISCONTINUED | OUTPATIENT
Start: 2024-01-31 | End: 2024-01-31 | Stop reason: HOSPADM

## 2024-01-30 RX ORDER — AMLODIPINE BESYLATE 5 MG/1
10 TABLET ORAL DAILY
Status: DISCONTINUED | OUTPATIENT
Start: 2024-01-31 | End: 2024-01-31 | Stop reason: HOSPADM

## 2024-01-30 RX ORDER — OXCARBAZEPINE 150 MG/1
300 TABLET, FILM COATED ORAL EVERY 12 HOURS SCHEDULED
Status: DISCONTINUED | OUTPATIENT
Start: 2024-01-30 | End: 2024-01-31 | Stop reason: HOSPADM

## 2024-01-30 RX ORDER — HYDROXYZINE PAMOATE 25 MG/1
5 CAPSULE ORAL 3 TIMES DAILY PRN
COMMUNITY
End: 2024-02-06

## 2024-01-30 RX ADMIN — BUSPIRONE HYDROCHLORIDE 30 MG: 10 TABLET ORAL at 22:43

## 2024-01-30 RX ADMIN — OXCARBAZEPINE 300 MG: 150 TABLET, FILM COATED ORAL at 22:43

## 2024-01-30 NOTE — Clinical Note
"Moises Valdez should be transferred out to \"Behavioral health unit\" and has been medically cleared.  "

## 2024-01-31 ENCOUNTER — HOSPITAL ENCOUNTER (INPATIENT)
Facility: HOSPITAL | Age: 44
LOS: 6 days | Discharge: HOME | DRG: 885 | End: 2024-02-06
Attending: STUDENT IN AN ORGANIZED HEALTH CARE EDUCATION/TRAINING PROGRAM | Admitting: PSYCHIATRY & NEUROLOGY
Payer: COMMERCIAL

## 2024-01-31 VITALS
RESPIRATION RATE: 16 BRPM | OXYGEN SATURATION: 96 % | DIASTOLIC BLOOD PRESSURE: 82 MMHG | HEART RATE: 98 BPM | TEMPERATURE: 97.8 F | SYSTOLIC BLOOD PRESSURE: 137 MMHG

## 2024-01-31 DIAGNOSIS — I10 HYPERTENSION, UNSPECIFIED TYPE: Primary | ICD-10-CM

## 2024-01-31 DIAGNOSIS — F31.12 BIPOLAR AFFECTIVE DISORDER, CURRENTLY MANIC, MODERATE (HCC): ICD-10-CM

## 2024-01-31 DIAGNOSIS — Z00.8 MEDICAL CLEARANCE FOR PSYCHIATRIC ADMISSION: ICD-10-CM

## 2024-01-31 DIAGNOSIS — E78.5 HYPERLIPIDEMIA, UNSPECIFIED HYPERLIPIDEMIA TYPE: ICD-10-CM

## 2024-01-31 DIAGNOSIS — Z00.8 ENCOUNTER FOR PSYCHOLOGICAL EVALUATION: ICD-10-CM

## 2024-01-31 LAB
ATRIAL RATE: 91 BPM
P AXIS: 39 DEGREES
PR INTERVAL: 154 MS
QRS AXIS: 44 DEGREES
QRSD INTERVAL: 92 MS
QT INTERVAL: 336 MS
QTC INTERVAL: 413 MS
T WAVE AXIS: 24 DEGREES
VENTRICULAR RATE: 91 BPM

## 2024-01-31 RX ORDER — ACETAMINOPHEN 325 MG/1
650 TABLET ORAL EVERY 6 HOURS PRN
Status: CANCELLED | OUTPATIENT
Start: 2024-01-31

## 2024-01-31 RX ORDER — BUSPIRONE HYDROCHLORIDE 30 MG/1
30 TABLET ORAL 2 TIMES DAILY
COMMUNITY
End: 2024-02-06

## 2024-01-31 RX ORDER — ASPIRIN 81 MG/1
81 TABLET, CHEWABLE ORAL DAILY
Status: CANCELLED | OUTPATIENT
Start: 2024-02-01

## 2024-01-31 RX ORDER — DIPHENHYDRAMINE HYDROCHLORIDE 50 MG/ML
50 INJECTION INTRAMUSCULAR; INTRAVENOUS EVERY 6 HOURS PRN
Status: CANCELLED | OUTPATIENT
Start: 2024-01-31

## 2024-01-31 RX ORDER — AMLODIPINE BESYLATE 10 MG/1
10 TABLET ORAL DAILY
COMMUNITY
End: 2024-02-06

## 2024-01-31 RX ORDER — BISACODYL 10 MG
10 SUPPOSITORY, RECTAL RECTAL DAILY PRN
Status: CANCELLED | OUTPATIENT
Start: 2024-01-31

## 2024-01-31 RX ORDER — ACETAMINOPHEN 325 MG/1
650 TABLET ORAL EVERY 4 HOURS PRN
Status: CANCELLED | OUTPATIENT
Start: 2024-01-31

## 2024-01-31 RX ORDER — BENZTROPINE MESYLATE 1 MG/ML
1 INJECTION INTRAMUSCULAR; INTRAVENOUS
Status: CANCELLED | OUTPATIENT
Start: 2024-01-31

## 2024-01-31 RX ORDER — HYDROXYZINE HYDROCHLORIDE 25 MG/1
25 TABLET, FILM COATED ORAL
Status: CANCELLED | OUTPATIENT
Start: 2024-01-31

## 2024-01-31 RX ORDER — ESCITALOPRAM OXALATE 5 MG/1
2.5 TABLET ORAL DAILY
Status: CANCELLED | OUTPATIENT
Start: 2024-02-01

## 2024-01-31 RX ORDER — DIPHENHYDRAMINE HYDROCHLORIDE 50 MG/ML
50 INJECTION INTRAMUSCULAR; INTRAVENOUS EVERY 6 HOURS PRN
Status: DISCONTINUED | OUTPATIENT
Start: 2024-01-31 | End: 2024-02-06 | Stop reason: HOSPADM

## 2024-01-31 RX ORDER — AMLODIPINE BESYLATE 5 MG/1
10 TABLET ORAL DAILY
Status: DISCONTINUED | OUTPATIENT
Start: 2024-02-01 | End: 2024-02-06 | Stop reason: HOSPADM

## 2024-01-31 RX ORDER — AMLODIPINE BESYLATE 5 MG/1
10 TABLET ORAL DAILY
Status: CANCELLED | OUTPATIENT
Start: 2024-02-01

## 2024-01-31 RX ORDER — HYDROXYZINE HYDROCHLORIDE 25 MG/1
50 TABLET, FILM COATED ORAL
Status: CANCELLED | OUTPATIENT
Start: 2024-01-31

## 2024-01-31 RX ORDER — LORAZEPAM 2 MG/ML
1 INJECTION INTRAMUSCULAR
Status: DISCONTINUED | OUTPATIENT
Start: 2024-01-31 | End: 2024-02-06 | Stop reason: HOSPADM

## 2024-01-31 RX ORDER — HALOPERIDOL 5 MG/ML
5 INJECTION INTRAMUSCULAR
Status: CANCELLED | OUTPATIENT
Start: 2024-01-31

## 2024-01-31 RX ORDER — HALOPERIDOL 5 MG/1
2.5 TABLET ORAL
Status: DISCONTINUED | OUTPATIENT
Start: 2024-01-31 | End: 2024-02-06 | Stop reason: HOSPADM

## 2024-01-31 RX ORDER — ACETAMINOPHEN 325 MG/1
975 TABLET ORAL EVERY 6 HOURS PRN
Status: DISCONTINUED | OUTPATIENT
Start: 2024-01-31 | End: 2024-02-06 | Stop reason: HOSPADM

## 2024-01-31 RX ORDER — BENZTROPINE MESYLATE 1 MG/1
1 TABLET ORAL
Status: DISCONTINUED | OUTPATIENT
Start: 2024-01-31 | End: 2024-02-06 | Stop reason: HOSPADM

## 2024-01-31 RX ORDER — ESCITALOPRAM OXALATE 5 MG/1
2.5 TABLET ORAL DAILY
Status: DISCONTINUED | OUTPATIENT
Start: 2024-02-01 | End: 2024-02-01

## 2024-01-31 RX ORDER — BENZTROPINE MESYLATE 1 MG/ML
0.5 INJECTION INTRAMUSCULAR; INTRAVENOUS
Status: DISCONTINUED | OUTPATIENT
Start: 2024-01-31 | End: 2024-02-06 | Stop reason: HOSPADM

## 2024-01-31 RX ORDER — HYDROXYZINE 50 MG/1
50 TABLET, FILM COATED ORAL
Status: DISCONTINUED | OUTPATIENT
Start: 2024-01-31 | End: 2024-02-06 | Stop reason: HOSPADM

## 2024-01-31 RX ORDER — HALOPERIDOL 1 MG/1
2.5 TABLET ORAL
Status: CANCELLED | OUTPATIENT
Start: 2024-01-31

## 2024-01-31 RX ORDER — POLYETHYLENE GLYCOL 3350 17 G/17G
17 POWDER, FOR SOLUTION ORAL DAILY PRN
Status: DISCONTINUED | OUTPATIENT
Start: 2024-01-31 | End: 2024-02-06 | Stop reason: HOSPADM

## 2024-01-31 RX ORDER — ACETAMINOPHEN 325 MG/1
975 TABLET ORAL EVERY 6 HOURS PRN
Status: CANCELLED | OUTPATIENT
Start: 2024-01-31

## 2024-01-31 RX ORDER — AMOXICILLIN 250 MG
1 CAPSULE ORAL DAILY PRN
Status: DISCONTINUED | OUTPATIENT
Start: 2024-01-31 | End: 2024-02-06 | Stop reason: HOSPADM

## 2024-01-31 RX ORDER — HYDROXYZINE HYDROCHLORIDE 25 MG/1
100 TABLET, FILM COATED ORAL
Status: CANCELLED | OUTPATIENT
Start: 2024-01-31

## 2024-01-31 RX ORDER — AMOXICILLIN 250 MG
1 CAPSULE ORAL DAILY PRN
Status: CANCELLED | OUTPATIENT
Start: 2024-01-31

## 2024-01-31 RX ORDER — LORAZEPAM 2 MG/ML
2 INJECTION INTRAMUSCULAR EVERY 6 HOURS PRN
Status: CANCELLED | OUTPATIENT
Start: 2024-01-31

## 2024-01-31 RX ORDER — BENZTROPINE MESYLATE 0.5 MG/1
1 TABLET ORAL
Status: CANCELLED | OUTPATIENT
Start: 2024-01-31

## 2024-01-31 RX ORDER — LORAZEPAM 2 MG/ML
1 INJECTION INTRAMUSCULAR
Status: CANCELLED | OUTPATIENT
Start: 2024-01-31

## 2024-01-31 RX ORDER — TRAZODONE HYDROCHLORIDE 50 MG/1
50 TABLET ORAL
Status: DISCONTINUED | OUTPATIENT
Start: 2024-01-31 | End: 2024-02-06 | Stop reason: HOSPADM

## 2024-01-31 RX ORDER — LISINOPRIL 10 MG/1
10 TABLET ORAL DAILY
Status: DISCONTINUED | OUTPATIENT
Start: 2024-02-01 | End: 2024-02-06 | Stop reason: HOSPADM

## 2024-01-31 RX ORDER — LORAZEPAM 2 MG/ML
2 INJECTION INTRAMUSCULAR
Status: CANCELLED | OUTPATIENT
Start: 2024-01-31

## 2024-01-31 RX ORDER — HALOPERIDOL 5 MG/1
5 TABLET ORAL
Status: DISCONTINUED | OUTPATIENT
Start: 2024-01-31 | End: 2024-02-06 | Stop reason: HOSPADM

## 2024-01-31 RX ORDER — LORAZEPAM 2 MG/ML
2 INJECTION INTRAMUSCULAR EVERY 6 HOURS PRN
Status: DISCONTINUED | OUTPATIENT
Start: 2024-01-31 | End: 2024-02-06 | Stop reason: HOSPADM

## 2024-01-31 RX ORDER — HALOPERIDOL 5 MG/1
5 TABLET ORAL
Status: CANCELLED | OUTPATIENT
Start: 2024-01-31

## 2024-01-31 RX ORDER — ASPIRIN 81 MG/1
81 TABLET, CHEWABLE ORAL DAILY
Status: DISCONTINUED | OUTPATIENT
Start: 2024-02-01 | End: 2024-02-06 | Stop reason: HOSPADM

## 2024-01-31 RX ORDER — ACETAMINOPHEN 325 MG/1
650 TABLET ORAL EVERY 4 HOURS PRN
Status: DISCONTINUED | OUTPATIENT
Start: 2024-01-31 | End: 2024-02-06 | Stop reason: HOSPADM

## 2024-01-31 RX ORDER — LANOLIN ALCOHOL/MO/W.PET/CERES
3 CREAM (GRAM) TOPICAL
Status: CANCELLED | OUTPATIENT
Start: 2024-01-31

## 2024-01-31 RX ORDER — TRAZODONE HYDROCHLORIDE 50 MG/1
50 TABLET ORAL
Status: CANCELLED | OUTPATIENT
Start: 2024-01-31

## 2024-01-31 RX ORDER — POLYETHYLENE GLYCOL 3350 17 G/17G
17 POWDER, FOR SOLUTION ORAL DAILY PRN
Status: CANCELLED | OUTPATIENT
Start: 2024-01-31

## 2024-01-31 RX ORDER — HALOPERIDOL 5 MG/ML
2.5 INJECTION INTRAMUSCULAR
Status: CANCELLED | OUTPATIENT
Start: 2024-01-31

## 2024-01-31 RX ORDER — HYDROXYZINE HYDROCHLORIDE 25 MG/1
25 TABLET, FILM COATED ORAL
Status: DISCONTINUED | OUTPATIENT
Start: 2024-01-31 | End: 2024-02-06 | Stop reason: HOSPADM

## 2024-01-31 RX ORDER — HALOPERIDOL 1 MG/1
1 TABLET ORAL EVERY 6 HOURS PRN
Status: CANCELLED | OUTPATIENT
Start: 2024-01-31

## 2024-01-31 RX ORDER — HALOPERIDOL 5 MG/ML
2.5 INJECTION INTRAMUSCULAR
Status: DISCONTINUED | OUTPATIENT
Start: 2024-01-31 | End: 2024-02-06 | Stop reason: HOSPADM

## 2024-01-31 RX ORDER — MAGNESIUM HYDROXIDE/ALUMINUM HYDROXICE/SIMETHICONE 120; 1200; 1200 MG/30ML; MG/30ML; MG/30ML
30 SUSPENSION ORAL EVERY 4 HOURS PRN
Status: CANCELLED | OUTPATIENT
Start: 2024-01-31

## 2024-01-31 RX ORDER — LORAZEPAM 2 MG/ML
2 INJECTION INTRAMUSCULAR
Status: DISCONTINUED | OUTPATIENT
Start: 2024-01-31 | End: 2024-02-06 | Stop reason: HOSPADM

## 2024-01-31 RX ORDER — ATORVASTATIN CALCIUM 40 MG/1
40 TABLET, FILM COATED ORAL DAILY
COMMUNITY
End: 2024-02-06

## 2024-01-31 RX ORDER — PANTOPRAZOLE SODIUM 20 MG/1
20 TABLET, DELAYED RELEASE ORAL
Status: DISCONTINUED | OUTPATIENT
Start: 2024-02-01 | End: 2024-02-06 | Stop reason: HOSPADM

## 2024-01-31 RX ORDER — HYDROXYZINE HYDROCHLORIDE 25 MG/1
25 TABLET, FILM COATED ORAL ONCE
Status: COMPLETED | OUTPATIENT
Start: 2024-01-31 | End: 2024-01-31

## 2024-01-31 RX ORDER — ACETAMINOPHEN 325 MG/1
650 TABLET ORAL EVERY 6 HOURS PRN
Status: DISCONTINUED | OUTPATIENT
Start: 2024-01-31 | End: 2024-02-06 | Stop reason: HOSPADM

## 2024-01-31 RX ORDER — LANOLIN ALCOHOL/MO/W.PET/CERES
3 CREAM (GRAM) TOPICAL
Status: DISCONTINUED | OUTPATIENT
Start: 2024-01-31 | End: 2024-02-01

## 2024-01-31 RX ORDER — OXCARBAZEPINE 150 MG/1
300 TABLET, FILM COATED ORAL EVERY 12 HOURS SCHEDULED
Status: CANCELLED | OUTPATIENT
Start: 2024-01-31

## 2024-01-31 RX ORDER — PANTOPRAZOLE SODIUM 20 MG/1
20 TABLET, DELAYED RELEASE ORAL
Status: CANCELLED | OUTPATIENT
Start: 2024-02-01

## 2024-01-31 RX ORDER — HALOPERIDOL 5 MG/ML
5 INJECTION INTRAMUSCULAR
Status: DISCONTINUED | OUTPATIENT
Start: 2024-01-31 | End: 2024-02-06 | Stop reason: HOSPADM

## 2024-01-31 RX ORDER — MAGNESIUM HYDROXIDE/ALUMINUM HYDROXICE/SIMETHICONE 120; 1200; 1200 MG/30ML; MG/30ML; MG/30ML
30 SUSPENSION ORAL EVERY 4 HOURS PRN
Status: DISCONTINUED | OUTPATIENT
Start: 2024-01-31 | End: 2024-02-06 | Stop reason: HOSPADM

## 2024-01-31 RX ORDER — HALOPERIDOL 1 MG/1
1 TABLET ORAL EVERY 6 HOURS PRN
Status: DISCONTINUED | OUTPATIENT
Start: 2024-01-31 | End: 2024-02-06 | Stop reason: HOSPADM

## 2024-01-31 RX ORDER — LISINOPRIL 10 MG/1
10 TABLET ORAL DAILY
Status: CANCELLED | OUTPATIENT
Start: 2024-02-01

## 2024-01-31 RX ORDER — HYDROXYZINE 50 MG/1
100 TABLET, FILM COATED ORAL
Status: DISCONTINUED | OUTPATIENT
Start: 2024-01-31 | End: 2024-02-06 | Stop reason: HOSPADM

## 2024-01-31 RX ORDER — NICOTINE 21 MG/24HR
14 PATCH, TRANSDERMAL 24 HOURS TRANSDERMAL ONCE
Status: DISCONTINUED | OUTPATIENT
Start: 2024-01-31 | End: 2024-01-31 | Stop reason: HOSPADM

## 2024-01-31 RX ORDER — BENZTROPINE MESYLATE 1 MG/ML
1 INJECTION INTRAMUSCULAR; INTRAVENOUS
Status: DISCONTINUED | OUTPATIENT
Start: 2024-01-31 | End: 2024-02-06 | Stop reason: HOSPADM

## 2024-01-31 RX ORDER — VILAZODONE HYDROCHLORIDE 10 MG/1
20 TABLET ORAL
Status: DISCONTINUED | OUTPATIENT
Start: 2024-02-01 | End: 2024-02-01

## 2024-01-31 RX ORDER — BENZTROPINE MESYLATE 1 MG/ML
0.5 INJECTION INTRAMUSCULAR; INTRAVENOUS
Status: CANCELLED | OUTPATIENT
Start: 2024-01-31

## 2024-01-31 RX ORDER — BISACODYL 10 MG
10 SUPPOSITORY, RECTAL RECTAL DAILY PRN
Status: DISCONTINUED | OUTPATIENT
Start: 2024-01-31 | End: 2024-02-06 | Stop reason: HOSPADM

## 2024-01-31 RX ORDER — VILAZODONE HYDROCHLORIDE 20 MG/1
20 TABLET ORAL
Status: CANCELLED | OUTPATIENT
Start: 2024-02-01

## 2024-01-31 RX ORDER — OXCARBAZEPINE 300 MG/1
300 TABLET, FILM COATED ORAL EVERY 12 HOURS SCHEDULED
Status: DISCONTINUED | OUTPATIENT
Start: 2024-01-31 | End: 2024-02-01

## 2024-01-31 RX ADMIN — BUSPIRONE HYDROCHLORIDE 30 MG: 10 TABLET ORAL at 08:13

## 2024-01-31 RX ADMIN — ASPIRIN 81 MG: 81 TABLET, CHEWABLE ORAL at 08:13

## 2024-01-31 RX ADMIN — BUSPIRONE HYDROCHLORIDE 30 MG: 10 TABLET ORAL at 18:58

## 2024-01-31 RX ADMIN — HYDROXYZINE HYDROCHLORIDE 25 MG: 25 TABLET, FILM COATED ORAL at 21:28

## 2024-01-31 RX ADMIN — NICOTINE 14 MG: 14 PATCH, EXTENDED RELEASE TRANSDERMAL at 11:13

## 2024-01-31 RX ADMIN — ESCITALOPRAM OXALATE 2.5 MG: 5 TABLET, FILM COATED ORAL at 08:13

## 2024-01-31 RX ADMIN — PANTOPRAZOLE SODIUM 20 MG: 20 TABLET, DELAYED RELEASE ORAL at 05:33

## 2024-01-31 RX ADMIN — OXCARBAZEPINE 300 MG: 150 TABLET, FILM COATED ORAL at 21:28

## 2024-01-31 RX ADMIN — OXCARBAZEPINE 300 MG: 150 TABLET, FILM COATED ORAL at 08:13

## 2024-01-31 RX ADMIN — AMLODIPINE BESYLATE 10 MG: 5 TABLET ORAL at 08:13

## 2024-01-31 RX ADMIN — ATORVASTATIN CALCIUM 40 MG: 40 TABLET, FILM COATED ORAL at 08:13

## 2024-01-31 RX ADMIN — Medication 3 MG: at 22:52

## 2024-01-31 NOTE — ED NOTES
"[facility] completed intake and safety assessment remotely with patient after he verbally agreed.  Patient was accompanied to the ER by his girlfriend for psychiatric evaluation.  Medical record reads, patient was seen at [facility] on 1/29/23 for the same. At that time patient initially agreed for inpatient treatment though later rescinded upon lack of bed availability. Patient alone currently. Patient has been medically cleared.  Patient states he has a history of bipolar disorder.  Patient reports anxiety, racing thoughts, difficulty concentrating, mood lability and issues with impulse control \"controlling my mouth... when I get to that impulsive state\".  Patient states he does not trust himself.  Patient believes his girlfriend has applied for housing and he is \"obsessively\" worrying that he may have to move out.  Patient states that his psychiatric medications \"may need to be tweaked\".  Patient states that he \"sleeps too much\".  Patient reports appetite as the \"same\".  Mood \"nervous\". Patient denies suicidal ideations/attempts, SIB, homicidal ideations, auditory hallucinations or visual hallucinations.  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  Patient denies any medical complaints, he states he sometimes wears his CPAP because it is very uncomfortable for him. Patient reports that his current issues with impulse control are causing strain with his relationship. Additionally the patient reports recent contact with his mother for the first time in 10 years .Patient reports he sees Dr. Lawrence at [facility] monthly for outpatient psychiatry care  - next appointment for 2/8.  Also sees a private therapist Uriel Arana weekly - needs to schedule.  Patient denies any history of inpatient behavioral health admissions. Patient denies any legal issues or access to firearms. Patient states, \"my girlfriend thinks I need inpatient\" and is requesting this writer speak with her.  " Willing to sign a 201.  Patient is requesting [facility].  Rights were explained and understood.  Faxed the 201 [place] for signatures.    Faxed completed 201 Intake.

## 2024-01-31 NOTE — ED NOTES
Crisis TT RN for phone number to call for assessment. Phone number provided.     Crisis called phone for assessment. Phone provided to patient. Pt on phone with crisis.      Rosie Oviedo RN  01/31/24 9078

## 2024-01-31 NOTE — ED NOTES
Per Dr. Laughlin pt may keep personal clothing on and keep personal items at bedside.      Nancie Carpenter RN  01/31/24 0031

## 2024-01-31 NOTE — ED PROVIDER NOTES
"History  Chief Complaint   Patient presents with    Psychiatric Evaluation     Reports racing thoughts, impulsivity, not level headed, not good decision making or thinking strait, reports mental health crisis. Reports being seen yesterday for same.  Reports current psychiatrist and therapist, reports taking medications. Denies SI/HI     Moises is a 43 year old male with history of anxiety, bipolar affective disorder, hypertension, hyperlipidemia, presenting for psychiatric evaluation.  He describes \"racing thoughts, impulsivity\", outbursts of anger.  He was evaluated yesterday for similar complaint, at that time did want inpatient psychiatric admission, however there were no immediate psychiatric beds available and patient then requested to be discharged home which was appropriate per providers who evaluated him.  Unfortunately, patient returns as he was unable to follow-up with his psychiatrist today and he has had continued similar symptoms.  He denies any SI or HI.  Denies any visual or auditory hallucinations.  Denies any drug or alcohol use.  He states that he has been taking his medications as prescribed.  Patient again hopeful for a 201 psychiatric admission.      History provided by:  Patient, medical records and significant other   used: No    Psychiatric Evaluation  Associated symptoms: anxiety    Associated symptoms: no abdominal pain and no chest pain        Prior to Admission Medications   Prescriptions Last Dose Informant Patient Reported? Taking?   ARIPiprazole (ABILIFY) 10 mg tablet Not Taking  Yes No   Sig: Take 10 mg by mouth daily   Patient not taking: Reported on 1/30/2024   OXcarbazepine (TRILEPTAL) 300 mg tablet 1/30/2024 Self Yes Yes   Sig: Take 300 mg by mouth every 12 (twelve) hours   amLODIPine (NORVASC) 2.5 mg tablet 1/30/2024 Self Yes Yes   Sig: Take 10 mg by mouth daily Pt unsure of dose at this time lowest dose used   aspirin 81 mg chewable tablet 1/30/2024  Yes Yes "   Sig: Chew 81 mg daily   atorvastatin (LIPITOR) 20 mg tablet 1/30/2024 Self Yes Yes   Sig: Take 40 mg by mouth daily   benztropine (COGENTIN) 0.5 mg tablet Not Taking  Yes No   Sig: Take 0.5 mg by mouth 2 (two) times a day   Patient not taking: Reported on 1/30/2024   busPIRone (BUSPAR) 10 mg tablet 1/30/2024 Self Yes Yes   Sig: Take 30 mg by mouth 2 (two) times a day   cariprazine (Vraylar) 3 MG capsule 1/30/2024 Self Yes Yes   Sig: Take 3 mg by mouth in the morning   escitalopram (LEXAPRO) 20 mg tablet 1/30/2024 Self Yes Yes   Sig: Take 2.5 mg by mouth daily   hydrOXYzine pamoate (VISTARIL) 25 mg capsule 1/30/2024 Self Yes Yes   Sig: Take 5 mg by mouth 3 (three) times a day as needed for anxiety   lisinopril (ZESTRIL) 10 mg tablet 1/30/2024  Yes Yes   Sig: Take 10 mg by mouth daily   omeprazole (PriLOSEC) 10 mg delayed release capsule 1/30/2024  Yes Yes   Sig: Take 20 mg by mouth daily   vilazodone (VIIBRYD) 20 mg tablet 1/30/2024 Self Yes Yes   Sig: Take 20 mg by mouth daily with breakfast      Facility-Administered Medications: None       Past Medical History:   Diagnosis Date    Anxiety     Bipolar affective disorder, manic (HCC)     Hypercholesteremia     Hypertension        History reviewed. No pertinent surgical history.    Family History   Problem Relation Age of Onset    No Known Problems Mother     No Known Problems Father      I have reviewed and agree with the history as documented.    E-Cigarette/Vaping    E-Cigarette Use Never User      E-Cigarette/Vaping Substances    Nicotine No     THC No     CBD No      Social History     Tobacco Use    Smoking status: Former     Current packs/day: 0.25     Types: Cigarettes    Smokeless tobacco: Never   Vaping Use    Vaping status: Never Used   Substance Use Topics    Alcohol use: Not Currently     Comment: Occasional     Drug use: Never     Comment: Illicit drugs:   none  - As per [place]         Review of Systems   Constitutional:  Negative for chills and  fever.   HENT:  Negative for ear pain and sore throat.    Eyes:  Negative for pain and visual disturbance.   Respiratory:  Negative for cough and shortness of breath.    Cardiovascular:  Negative for chest pain and palpitations.   Gastrointestinal:  Negative for abdominal pain and vomiting.   Genitourinary:  Negative for dysuria and hematuria.   Musculoskeletal:  Negative for arthralgias and back pain.   Skin:  Negative for color change and rash.   Neurological:  Negative for seizures and syncope.   Psychiatric/Behavioral:  The patient is nervous/anxious and is hyperactive.    All other systems reviewed and are negative.      Physical Exam  ED Triage Vitals [01/30/24 2012]   Temperature Pulse Respirations Blood Pressure SpO2   97.8 °F (36.6 °C) (!) 107 16 165/78 97 %      Temp src Heart Rate Source Patient Position - Orthostatic VS BP Location FiO2 (%)   -- Monitor Sitting Right arm --      Pain Score       --             Orthostatic Vital Signs  Vitals:    01/30/24 2012 01/30/24 2246   BP: 165/78 140/81   Pulse: (!) 107 91   Patient Position - Orthostatic VS: Sitting        Physical Exam  Vitals and nursing note reviewed.   Constitutional:       Appearance: Normal appearance.   HENT:      Head: Normocephalic and atraumatic.      Right Ear: External ear normal.      Left Ear: External ear normal.      Mouth/Throat:      Mouth: Mucous membranes are moist.      Pharynx: Oropharynx is clear.   Eyes:      General: No scleral icterus.     Conjunctiva/sclera: Conjunctivae normal.   Cardiovascular:      Rate and Rhythm: Normal rate and regular rhythm.      Pulses: Normal pulses.      Heart sounds: Normal heart sounds.   Pulmonary:      Effort: Pulmonary effort is normal. No respiratory distress.      Breath sounds: Normal breath sounds.   Abdominal:      General: Abdomen is flat. There is no distension.      Tenderness: There is no abdominal tenderness.   Musculoskeletal:         General: No tenderness or signs of injury.       Cervical back: Neck supple. No rigidity.   Skin:     General: Skin is warm.      Coloration: Skin is not jaundiced.      Findings: No erythema or rash.   Neurological:      General: No focal deficit present.      Mental Status: He is alert. Mental status is at baseline.   Psychiatric:         Mood and Affect: Mood normal.         Behavior: Behavior normal.         ED Medications  Medications   busPIRone (BUSPAR) tablet 30 mg (30 mg Oral Given 1/30/24 2243)   OXcarbazepine (TRILEPTAL) tablet 300 mg (300 mg Oral Given 1/30/24 2243)   amLODIPine (NORVASC) tablet 10 mg (has no administration in time range)   pantoprazole (PROTONIX) EC tablet 20 mg (20 mg Oral Given 1/31/24 0533)   aspirin chewable tablet 81 mg (has no administration in time range)   escitalopram (LEXAPRO) tablet 2.5 mg (has no administration in time range)   atorvastatin (LIPITOR) tablet 40 mg (has no administration in time range)       Diagnostic Studies  Results Reviewed       Procedure Component Value Units Date/Time    Rapid drug screen, urine [[id number]]  (Normal) Collected: 01/30/24 2132    Lab Status: Final result Specimen: Urine, Clean Catch Updated: 01/30/24 2154     Amph/Meth UR Negative     Barbiturate Ur Negative     Benzodiazepine Urine Negative     Cocaine Urine Negative     Methadone Urine Negative     Opiate Urine Negative     PCP Ur Negative     THC Urine Negative     Oxycodone Urine Negative    Narrative:      FOR MEDICAL PURPOSES ONLY.   IF CONFIRMATION NEEDED PLEASE CONTACT THE LAB WITHIN 5 DAYS.    Drug Screen Cutoff Levels:  AMPHETAMINE/METHAMPHETAMINES  1000 ng/mL  BARBITURATES     200 ng/mL  BENZODIAZEPINES     200 ng/mL  COCAINE      300 ng/mL  METHADONE      300 ng/mL  OPIATES      300 ng/mL  PHENCYCLIDINE     25 ng/mL  THC       50 ng/mL  OXYCODONE      100 ng/mL    POCT alcohol breath test [[id number]]  (Normal) Resulted: 01/30/24 2130    Lab Status: Final result Updated: 01/30/24 2130     EXTBreath Alcohol 0.00                    No orders to display         Procedures  ECG 12 Lead Documentation Only    Date/Time: 1/30/2024 11:36 PM    Performed by: Francisco Javier Laughlin DO  Authorized by: Francisco Javier Laughlin DO    Indications / Diagnosis:  Tachycardia, assess for QT length  ECG reviewed by me, the ED Provider: yes    Patient location:  ED  Previous ECG:     Previous ECG:  Compared to current    Similarity:  No change  Interpretation:     Interpretation: normal    Rate:     ECG rate:  91    ECG rate assessment: normal    Rhythm:     Rhythm: sinus rhythm    Ectopy:     Ectopy: none    QRS:     QRS axis:  Normal    QRS intervals:  Normal  Conduction:     Conduction: normal    ST segments:     ST segments:  Normal  T waves:     T waves: flattening and inverted      Flattening:  III    Inverted:  V6        ED Course  ED Course as of 01/31/24 0655   Tue Jan 30, 2024   5875 Crisis team unable to evaluate patient at this time, he will await their evaluation until the morning                                       Medical Decision Making  Moises is a 43 year old male with history of anxiety, bipolar affective disorder, hypertension, hyperlipidemia, presenting for psychiatric evaluation.  Patient describes racing thoughts and impulsivity, outbursts of anger.  He was seen yesterday for similar complaint at that time was considering to 1 psychiatric admission before ultimately deciding to attempt outpatient therapy.  He denies suicidal or homicidal ideations, no hallucinations, no drug or alcohol use.    Patient was anxious on exam, otherwise exam was unremarkable.  He was calm and cooperative.    Patient medically cleared for evaluation by crisis worker, unfortunately at this time a day there is no crisis worker available, patient was agreeable to spend the night in the emergency department to discuss possible inpatient psychiatric admission in the morning.  Patient's psychiatric medications were ordered in the meantime.  Patient  remained well-appearing throughout his stay in the emergency department did not require any acute interventions.    Patient signed out to a.m. team., Dr. Calvillo.     Problems Addressed:  Encounter for psychological evaluation: acute illness or injury    Amount and/or Complexity of Data Reviewed  Labs: ordered.    Risk  OTC drugs.  Prescription drug management.          Disposition  Final diagnoses:   Encounter for psychological evaluation     Time reflects when diagnosis was documented in both MDM as applicable and the Disposition within this note       Time User Action Codes Description Comment    1/31/2024  5:21 AM Francisco Javier Laughlin Add [Z00.8] Encounter for psychological evaluation           ED Disposition       None          Follow-up Information    None         Patient's Medications   Discharge Prescriptions    No medications on file     No discharge procedures on file.    PDMP Review         Value Time User    PDMP Reviewed  Yes 4/15/2021 10:44 AM Colton Huynh DO             ED Provider  Attending physically available and evaluated Moises Valdez. I managed the patient along with the ED Attending.    Electronically Signed by           Francisco Javier Laughlin DO  01/31/24 0652       Francisco Javier Laughlin DO  01/31/24 0655

## 2024-01-31 NOTE — EMTALA/ACUTE CARE TRANSFER
Critical access hospital EMERGENCY DEPARTMENT  [address] [facility] [address]  Dept: [phone]      EMTALA TRANSFER CONSENT    NAME Moises Valdez                                         1980                              MRN [id number]    I have been informed of my rights regarding examination, treatment, and transfer   by Dr. Thais Lakhani DO    Benefits: Specialized equipment and/or services available at the receiving facility (Include comment)________________________ (inpatient mental health treatment)    Risks: Other: (Include comment)__________________________ (decompensation)      Consent for Transfer:  I acknowledge that my medical condition has been evaluated and explained to me by the emergency department physician or other qualified medical person and/or my attending physician, who has recommended that I be transferred to the service of  Accepting Physician: Dr. Osorio at Accepting Facility Name, City & State : [place], [place]. The above potential benefits of such transfer, the potential risks associated with such transfer, and the probable risks of not being transferred have been explained to me, and I fully understand them.  The doctor has explained that, in my case, the benefits of transfer outweigh the risks.  I agree to be transferred.    I authorize the performance of emergency medical procedures and treatments upon me in both transit and upon arrival at the receiving facility.  Additionally, I authorize the release of any and all medical records to the receiving facility and request they be transported with me, if possible.  I understand that the safest mode of transportation during a medical emergency is an ambulance and that the Hospital advocates the use of this mode of transport. Risks of traveling to the receiving facility by car, including absence of medical control, life sustaining equipment, such as oxygen, and medical personnel has been explained to  me and I fully understand them.    (DEE DEE CORRECT BOX BELOW)  [  ]  I consent to the stated transfer and to be transported by ambulance/helicopter.  [  ]  I consent to the stated transfer, but refuse transportation by ambulance and accept full responsibility for my transportation by car.  I understand the risks of non-ambulance transfers and I exonerate the Hospital and its staff from any deterioration in my condition that results from this refusal.    X___________________________________________    DATE  24  TIME________  Signature of patient or legally responsible individual signing on patient behalf           RELATIONSHIP TO PATIENT_________________________          Provider Certification    NAME Moises Valdez                                         1980                              MRN [id number]    A medical screening exam was performed on the above named patient.  Based on the examination:    Condition Necessitating Transfer The primary encounter diagnosis was Bipolar disorder (HCC). A diagnosis of Encounter for psychological evaluation was also pertinent to this visit.    Patient Condition: The patient has been stabilized such that within reasonable medical probability, no material deterioration of the patient condition or the condition of the unborn child(ethan) is likely to result from the transfer    Reason for Transfer: Level of Care needed not available at this facility    Transfer Requirements: Facility [place], [place]   Space available and qualified personnel available for treatment as acknowledged by Momo [phone]  Agreed to accept transfer and to provide appropriate medical treatment as acknowledged by       Dr. Osorio  Appropriate medical records of the examination and treatment of the patient are provided at the time of transfer   STAFF INITIAL WHEN COMPLETED _______  Transfer will be performed by qualified personnel from [facility]  and appropriate transfer equipment as  required, including the use of necessary and appropriate life support measures.    Provider Certification: I have examined the patient and explained the following risks and benefits of being transferred/refusing transfer to the patient/family:         Based on these reasonable risks and benefits to the patient and/or the unborn child(ethan), and based upon the information available at the time of the patient’s examination, I certify that the medical benefits reasonably to be expected from the provision of appropriate medical treatments at another medical facility outweigh the increasing risks, if any, to the individual’s medical condition, and in the case of labor to the unborn child, from effecting the transfer.    X____________________________________________ DATE 01/31/24        TIME_______      ORIGINAL - SEND TO MEDICAL RECORDS   COPY - SEND WITH PATIENT DURING TRANSFER

## 2024-01-31 NOTE — ED NOTES
Patient is accepted at [facility]  Patient is accepted by  Dr. Jo DAY    Transportation is arranged with [employer].     Transportation is scheduled for 2200 CTS   Patient may go to the floor at 2230         Nurse report is to be called to [phone] prior to patient transfer.

## 2024-02-01 PROBLEM — F31.12 BIPOLAR AFFECTIVE DISORDER, CURRENTLY MANIC, MODERATE (HCC): Status: ACTIVE | Noted: 2024-02-01

## 2024-02-01 PROBLEM — R79.89 ELEVATED TSH: Status: ACTIVE | Noted: 2024-02-01

## 2024-02-01 LAB
25(OH)D3 SERPL-MCNC: 19.9 NG/ML (ref 30–100)
ALBUMIN SERPL BCP-MCNC: 4.4 G/DL (ref 3.5–5)
ALP SERPL-CCNC: 128 U/L (ref 34–104)
ALT SERPL W P-5'-P-CCNC: 35 U/L (ref 7–52)
ANION GAP SERPL CALCULATED.3IONS-SCNC: 10 MMOL/L
AST SERPL W P-5'-P-CCNC: 20 U/L (ref 13–39)
BACTERIA UR QL AUTO: ABNORMAL /HPF
BASOPHILS # BLD AUTO: 0.08 THOUSANDS/ÂΜL (ref 0–0.1)
BASOPHILS NFR BLD AUTO: 1 % (ref 0–1)
BILIRUB SERPL-MCNC: 0.3 MG/DL (ref 0.2–1)
BILIRUB UR QL STRIP: NEGATIVE
BUN SERPL-MCNC: 17 MG/DL (ref 5–25)
CALCIUM SERPL-MCNC: 8.8 MG/DL (ref 8.4–10.2)
CHLORIDE SERPL-SCNC: 104 MMOL/L (ref 96–108)
CHOLEST SERPL-MCNC: 143 MG/DL
CLARITY UR: CLEAR
CO2 SERPL-SCNC: 27 MMOL/L (ref 21–32)
COLOR UR: YELLOW
CREAT SERPL-MCNC: 1.02 MG/DL (ref 0.6–1.3)
EOSINOPHIL # BLD AUTO: 0.3 THOUSAND/ÂΜL (ref 0–0.61)
EOSINOPHIL NFR BLD AUTO: 5 % (ref 0–6)
ERYTHROCYTE [DISTWIDTH] IN BLOOD BY AUTOMATED COUNT: 13.1 % (ref 11.6–15.1)
FOLATE SERPL-MCNC: 11.8 NG/ML
GFR SERPL CREATININE-BSD FRML MDRD: 90 ML/MIN/1.73SQ M
GLUCOSE P FAST SERPL-MCNC: 92 MG/DL (ref 65–99)
GLUCOSE SERPL-MCNC: 92 MG/DL (ref 65–140)
GLUCOSE UR STRIP-MCNC: NEGATIVE MG/DL
HCT VFR BLD AUTO: 40.1 % (ref 36.5–49.3)
HDLC SERPL-MCNC: 27 MG/DL
HGB BLD-MCNC: 13.6 G/DL (ref 12–17)
HGB UR QL STRIP.AUTO: NEGATIVE
IMM GRANULOCYTES # BLD AUTO: 0.02 THOUSAND/UL (ref 0–0.2)
IMM GRANULOCYTES NFR BLD AUTO: 0 % (ref 0–2)
KETONES UR STRIP-MCNC: NEGATIVE MG/DL
LEUKOCYTE ESTERASE UR QL STRIP: NEGATIVE
LYMPHOCYTES # BLD AUTO: 2.15 THOUSANDS/ÂΜL (ref 0.6–4.47)
LYMPHOCYTES NFR BLD AUTO: 37 % (ref 14–44)
MCH RBC QN AUTO: 29.4 PG (ref 26.8–34.3)
MCHC RBC AUTO-ENTMCNC: 33.9 G/DL (ref 31.4–37.4)
MCV RBC AUTO: 87 FL (ref 82–98)
MONOCYTES # BLD AUTO: 0.59 THOUSAND/ÂΜL (ref 0.17–1.22)
MONOCYTES NFR BLD AUTO: 10 % (ref 4–12)
NEUTROPHILS # BLD AUTO: 2.69 THOUSANDS/ÂΜL (ref 1.85–7.62)
NEUTS SEG NFR BLD AUTO: 47 % (ref 43–75)
NITRITE UR QL STRIP: NEGATIVE
NON-SQ EPI CELLS URNS QL MICRO: ABNORMAL /HPF
NONHDLC SERPL-MCNC: 116 MG/DL
NRBC BLD AUTO-RTO: 0 /100 WBCS
PH UR STRIP.AUTO: 5.5 [PH]
PLATELET # BLD AUTO: 272 THOUSANDS/UL (ref 149–390)
PMV BLD AUTO: 9.7 FL (ref 8.9–12.7)
POTASSIUM SERPL-SCNC: 3.7 MMOL/L (ref 3.5–5.3)
PROT SERPL-MCNC: 6.9 G/DL (ref 6.4–8.4)
PROT UR STRIP-MCNC: NEGATIVE MG/DL
RBC # BLD AUTO: 4.63 MILLION/UL (ref 3.88–5.62)
RBC #/AREA URNS AUTO: ABNORMAL /HPF
SODIUM SERPL-SCNC: 141 MMOL/L (ref 135–147)
SP GR UR STRIP.AUTO: >=1.03 (ref 1–1.03)
T4 FREE SERPL-MCNC: 0.78 NG/DL (ref 0.61–1.12)
TRIGL SERPL-MCNC: 496 MG/DL
TSH SERPL DL<=0.05 MIU/L-ACNC: 5.95 UIU/ML (ref 0.45–4.5)
UROBILINOGEN UR STRIP-ACNC: <2 MG/DL
VIT B12 SERPL-MCNC: 476 PG/ML (ref 180–914)
WBC # BLD AUTO: 5.83 THOUSAND/UL (ref 4.31–10.16)
WBC #/AREA URNS AUTO: ABNORMAL /HPF

## 2024-02-01 PROCEDURE — 80061 LIPID PANEL: CPT | Performed by: PSYCHIATRY & NEUROLOGY

## 2024-02-01 PROCEDURE — 81001 URINALYSIS AUTO W/SCOPE: CPT | Performed by: PSYCHIATRY & NEUROLOGY

## 2024-02-01 PROCEDURE — 85025 COMPLETE CBC W/AUTO DIFF WBC: CPT | Performed by: PSYCHIATRY & NEUROLOGY

## 2024-02-01 PROCEDURE — 99223 1ST HOSP IP/OBS HIGH 75: CPT | Performed by: PSYCHIATRY & NEUROLOGY

## 2024-02-01 PROCEDURE — 82746 ASSAY OF FOLIC ACID SERUM: CPT | Performed by: PSYCHIATRY & NEUROLOGY

## 2024-02-01 PROCEDURE — 84439 ASSAY OF FREE THYROXINE: CPT | Performed by: PSYCHIATRY & NEUROLOGY

## 2024-02-01 PROCEDURE — 82607 VITAMIN B-12: CPT | Performed by: PSYCHIATRY & NEUROLOGY

## 2024-02-01 PROCEDURE — 84443 ASSAY THYROID STIM HORMONE: CPT | Performed by: PSYCHIATRY & NEUROLOGY

## 2024-02-01 PROCEDURE — 80053 COMPREHEN METABOLIC PANEL: CPT | Performed by: PSYCHIATRY & NEUROLOGY

## 2024-02-01 PROCEDURE — 99252 IP/OBS CONSLTJ NEW/EST SF 35: CPT | Performed by: PHYSICIAN ASSISTANT

## 2024-02-01 PROCEDURE — 82306 VITAMIN D 25 HYDROXY: CPT | Performed by: PSYCHIATRY & NEUROLOGY

## 2024-02-01 RX ORDER — ATORVASTATIN CALCIUM 40 MG/1
40 TABLET, FILM COATED ORAL
Status: DISCONTINUED | OUTPATIENT
Start: 2024-02-01 | End: 2024-02-06 | Stop reason: HOSPADM

## 2024-02-01 RX ORDER — VILAZODONE HYDROCHLORIDE 10 MG/1
10 TABLET ORAL
Status: DISCONTINUED | OUTPATIENT
Start: 2024-02-01 | End: 2024-02-02

## 2024-02-01 RX ORDER — ERGOCALCIFEROL 1.25 MG/1
50000 CAPSULE ORAL WEEKLY
Status: DISCONTINUED | OUTPATIENT
Start: 2024-02-01 | End: 2024-02-06 | Stop reason: HOSPADM

## 2024-02-01 RX ORDER — NICOTINE 21 MG/24HR
1 PATCH, TRANSDERMAL 24 HOURS TRANSDERMAL DAILY
Status: DISCONTINUED | OUTPATIENT
Start: 2024-02-01 | End: 2024-02-06 | Stop reason: HOSPADM

## 2024-02-01 RX ADMIN — CARIPRAZINE 3 MG: 3 CAPSULE, GELATIN COATED ORAL at 09:10

## 2024-02-01 RX ADMIN — AMLODIPINE BESYLATE 10 MG: 5 TABLET ORAL at 09:04

## 2024-02-01 RX ADMIN — PANTOPRAZOLE SODIUM 20 MG: 20 TABLET, DELAYED RELEASE ORAL at 06:18

## 2024-02-01 RX ADMIN — NICOTINE 1 PATCH: 21 PATCH, EXTENDED RELEASE TRANSDERMAL at 13:04

## 2024-02-01 RX ADMIN — LISINOPRIL 10 MG: 10 TABLET ORAL at 09:04

## 2024-02-01 RX ADMIN — ASPIRIN 81 MG CHEWABLE TABLET 81 MG: 81 TABLET CHEWABLE at 09:04

## 2024-02-01 RX ADMIN — ATORVASTATIN CALCIUM 40 MG: 40 TABLET, FILM COATED ORAL at 16:51

## 2024-02-01 RX ADMIN — TRAZODONE HYDROCHLORIDE 50 MG: 50 TABLET ORAL at 21:43

## 2024-02-01 RX ADMIN — VILAZODONE HYDROCHLORIDE 10 MG: 10 TABLET ORAL at 09:16

## 2024-02-01 RX ADMIN — ACETAMINOPHEN 975 MG: 325 TABLET ORAL at 15:12

## 2024-02-01 RX ADMIN — DIVALPROEX SODIUM 750 MG: 250 TABLET, EXTENDED RELEASE ORAL at 09:11

## 2024-02-01 NOTE — NURSING NOTE
1 Winter Jacket  1 wallet with cards  1 cell phone  1   1 Deoderant  2 socks  2 underwear  1 shirt  1 sweatpants  1 pair of sneakers

## 2024-02-01 NOTE — PLAN OF CARE
Problem: DEPRESSION  Goal: Will be euthymic at discharge  Description: INTERVENTIONS:  - Administer medication as ordered  - Provide emotional support via 1:1 interaction with staff  - Encourage involvement in milieu/groups/activities  - Monitor for social isolation  Outcome: Progressing     Problem: ROSALEE  Goal: Will exhibit normal sleep and speech and no impulsivity  Description: INTERVENTIONS:  - Administer medication as ordered  - Set limits on impulsive behavior  - Make attempts to decrease external stimuli as possible  Outcome: Progressing     Problem: PSYCHOSIS  Goal: Will report no hallucinations or delusions  Description: Interventions:  - Administer medication as  ordered  - Every waking shifts and PRN assess for the presence of hallucinations and or delusions  - Assist with reality testing to support increasing orientation  - Assess if patient's hallucinations or delusions are encouraging self-harm or harm to others and intervene as appropriate  Outcome: Progressing     Problem: ANXIETY  Goal: Will report anxiety at manageable levels  Description: INTERVENTIONS:  - Administer medication as ordered  - Teach and encourage coping skills  - Provide emotional support  - Assess patient/family for anxiety and ability to cope  Outcome: Progressing  Goal: By discharge: Patient will verbalize 2 strategies to deal with anxiety  Description: Interventions:  - Identify any obvious source/trigger to anxiety  - Staff will assist patient in applying identified coping technique/skills  - Encourage attendance of scheduled groups and activities  Outcome: Progressing     Problem: SLEEP DISTURBANCE  Goal: Will exhibit normal sleeping pattern  Description: Interventions:  -  Assess the patients sleep pattern, noting recent changes  - Administer medication as ordered  - Decrease environmental stimuli, including noise, as appropriate during the night  - Encourage the patient to actively participate in unit groups and or  exercise during the day to enhance ability to achieve adequate sleep at night  - Assess the patient, in the morning, encouraging a description of sleep experience  Outcome: Progressing     Problem: Ineffective Coping  Goal: Identifies healthy coping skills  Outcome: Progressing  Goal: Participates in unit activities  Description: Interventions:  - Provide therapeutic environment   - Provide required programming   - Redirect inappropriate behaviors   Outcome: Progressing

## 2024-02-01 NOTE — NURSING NOTE
"Pt received Tylenol 975mg for 8/10 headache at 1512. On pain reassessment pt reports mild improvement, now rates 4/10. Pt believes his headache is r/t coming off Lexapro. Pt reports a fair mood. Denies SI, HI and hallucinations, pt using humor, stated \"none of the fun stuff today.\" Pt remains compliant with medications and he attends meals. He denies any needs or questions at this time.   "

## 2024-02-01 NOTE — TREATMENT PLAN
TREATMENT PLAN REVIEW - Behavioral Health Moises Valdez 43 y.o. 1980 male MRN: [id number]    [facility] Room / Bed: [facility] 215-02 Encounter: [id number]          Admit Date/Time:  1/31/2024 10:04 PM    Treatment Team:   DO Ben Anderson, MIGUELINA Franklin, RN  Anum Tracy, MIGUELINA Carolina, MIGUELINA Jo, MIGUELINA Martinez, MIGUELINA Silver    Diagnosis: Principal Problem:    Bipolar affective disorder, currently manic, moderate (HCC)  Active Problems:    HTN (hypertension)    MILAGRO (obstructive sleep apnea)      Patient Strengths/Assets: ability for insight    Patient Barriers/Limitations: poor past treatment response    Short Term Goals:  decrease in impulsive/risky behaviors    Long Term Goals: stabilization of mood    Progress Towards Goals: starting psychiatric medications as prescribed, continue psychiatric medications as prescribed, improving gradually    Recommended Treatment: medication management, patient medication education, group therapy, milieu therapy, continued Behavioral Health psychiatric evaluation/assessment process    Treatment Frequency: daily medication monitoring, group and milieu therapy daily, monitoring through interdisciplinary rounds, monitoring through weekly patient care conferences    Expected Discharge Date:  5-10 days    Discharge Plan: discharge to home    Treatment Plan Created/Updated By: Pj Gamez DO
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
## 2024-02-01 NOTE — ED NOTES
Insurance Authorization for admission: precertification  Phone call placed to [employer]   Phone number: [phone]    Spoke to Shannon DAY    5 days approved.  Level of care: IP  Review on 2/4/2024   Authorization # pending       Spoke to Shannon who asked for clinical to be faxed to [phone].  He stated that patient's usually receive 5 days but auth is pending before clinical reviewed.  Reference number is [id number].    [facility] faxed chart, 201 and facesheet to number of insurance listed above.      Eligibility Verification System checked - ([phone]).  Online system / automated system indicates: not eligible

## 2024-02-01 NOTE — PLAN OF CARE
Problem: DEPRESSION  Goal: Will be euthymic at discharge  Description: INTERVENTIONS:  - Administer medication as ordered  - Provide emotional support via 1:1 interaction with staff  - Encourage involvement in milieu/groups/activities  - Monitor for social isolation  Outcome: Progressing     Problem: ROSALEE  Goal: Will exhibit normal sleep and speech and no impulsivity  Description: INTERVENTIONS:  - Administer medication as ordered  - Set limits on impulsive behavior  - Make attempts to decrease external stimuli as possible  Outcome: Progressing     Problem: PSYCHOSIS  Goal: Will report no hallucinations or delusions  Description: Interventions:  - Administer medication as  ordered  - Every waking shifts and PRN assess for the presence of hallucinations and or delusions  - Assist with reality testing to support increasing orientation  - Assess if patient's hallucinations or delusions are encouraging self-harm or harm to others and intervene as appropriate  Outcome: Progressing     Problem: ANXIETY  Goal: Will report anxiety at manageable levels  Description: INTERVENTIONS:  - Administer medication as ordered  - Teach and encourage coping skills  - Provide emotional support  - Assess patient/family for anxiety and ability to cope  Outcome: Progressing  Goal: By discharge: Patient will verbalize 2 strategies to deal with anxiety  Description: Interventions:  - Identify any obvious source/trigger to anxiety  - Staff will assist patient in applying identified coping technique/skills  - Encourage attendance of scheduled groups and activities  Outcome: Progressing     Problem: SLEEP DISTURBANCE  Goal: Will exhibit normal sleeping pattern  Description: Interventions:  -  Assess the patients sleep pattern, noting recent changes  - Administer medication as ordered  - Decrease environmental stimuli, including noise, as appropriate during the night  - Encourage the patient to actively participate in unit groups and or  exercise during the day to enhance ability to achieve adequate sleep at night  - Assess the patient, in the morning, encouraging a description of sleep experience  Outcome: Progressing

## 2024-02-01 NOTE — NURSING NOTE
RN will meet with patient at least twice per day to assess for any concerns, teach about prescribed medications and diagnosis. Patient will be taught and encouraged to utilize healthy coping skills.

## 2024-02-01 NOTE — NURSING NOTE
Patient baylee currently denies SI Hi AVH. He currently reports that he is not having racing thoughts, and feel that his impulsivity is decreased

## 2024-02-01 NOTE — CMS CERTIFICATION NOTE
Recertification: Based upon physical, mental and social evaluations, I certify that inpatient psychiatric services continue to be medically necessary for this patient for a duration of 7 midnights for the treatment of  Bipolar affective disorder, currently manic, moderate (HCC) Available alternative community resources still do not meet the patient's mental health care needs. I further attest that an established written individualized plan of care has been updated and is outlined in the patient's medical records.

## 2024-02-01 NOTE — ASSESSMENT & PLAN NOTE
Admission labs reviewed, CBC, CMP, RPR, HbA1c, lipid panel, TSH, UA acceptable  TSH elevated at 5.9, awaiting free T4  Vitals stable   UDS negative  COVID-19 negative  EKG reveals NSR 91bpm, QTC 413ms, T wave inversions in v4-v6 (chronic)  Medically stable for continued inpatient psychiatric treatment based on available results  Please contact [facility]IM with any questions or concerns

## 2024-02-01 NOTE — NURSING NOTE
Bedside  - 1 Pair of shorts  - 2 pairs of sweatpants (1 black, 1 grey)  - 3 Shortsleeve t-shirts ( 2 grey, 1 blue)  - 1 Longsleeve t-shirt (dark grey)  - Tie-dye pullover  - Degree deodorant  - Sensodyne toothpaste  - Personal toothbrush  - 5 pairs of underwear  - Sneakers with out laces  - 5 Pairs of black socks    Bin  - Plastic toothbrush cover  - 2 Pairs of basketball shorts   - 2 pairs of sweatpants (1 black, 1 dark grey)  - 2 Shortsleeve (1 black, 1 grey)    Nurses [facility]

## 2024-02-01 NOTE — CONSULTS
[facility]  Consult  Name: Moises Valdez 43 y.o. male I MRN: [id number]  Unit/Bed#: -02 I Date of Admission: 1/31/2024   Date of Service: 2/1/2024 I Hospital Day: 1    Inpatient consult for Medical Clearance for  patient  Consult performed by: Michele Bauer PA-C  Consult ordered by: Candice Osorio MD        Assessment/Plan   * Medical clearance for psychiatric admission  Assessment & Plan  Admission labs reviewed, CBC, CMP, RPR, HbA1c, lipid panel, TSH, UA acceptable  TSH elevated at 5.9, awaiting free T4  Vitals stable   UDS negative  COVID-19 negative  EKG reveals NSR 91bpm, QTC 413ms, T wave inversions in v4-v6 (chronic)  Medically stable for continued inpatient psychiatric treatment based on available results  Please contact [facility] with any questions or concerns      Elevated TSH  Assessment & Plan  TSH 5.9  Await free T4    Bipolar affective disorder, currently manic, moderate (HCC)  Assessment & Plan  Management per psychiatric services    MILAGRO (obstructive sleep apnea)  Assessment & Plan  CPAP qHS    HTN (hypertension)  Assessment & Plan  Continue lisinopril and amlodipine  /85           Recommendations for Discharge:  [facility] will continue to be available for any questions or concerns.  Follow up with PCP upon discharge.     Counseling / Coordination of Care Time: 30 minutes.  Greater than 50% of total time spent on patient counseling and coordination of care.    Collaboration of Care: Were Recommendations Directly Discussed with Primary Treatment Team? - Yes     History of Present Illness:    Moises Valdez is a 43 y.o. male with PMH anxiety, bipolar affective, HTN, HLD who is originally admitted to the psychiatric service due to persistent racing thoughts, impulsivity, outbursts of anger unable to be managed at home. We are consulted for medical clearance for psychiatric hospitalization and medical management.  Patient has a past medical history of hypertension which  is well managed on lisinopril and amlodipine as well as a history of hyperlipidemia on Lipitor 40 mg.  Additionally, I takes a baby aspirin 81 mg for CVA/ACS prevention empirically.  He denies any other past medical history including asthma, thyroid disorder, T2DM.  He denies any current chest pain, shortness of breath, lightness, nausea, vomiting, abdominal pain, changes in vision.  Reviewed labs, ECG, vital signs.  ECG did show some T wave inversions/biphasic T waves in leads V4-V6 which which are chronic findings.  Medically stable for psychiatric admission.    Review of Systems:    Review of Systems   Constitutional:  Negative for appetite change, chills, fatigue and fever.   HENT:  Negative for ear pain, sore throat and trouble swallowing.    Eyes:  Negative for visual disturbance.   Respiratory:  Negative for cough, chest tightness, shortness of breath and wheezing.    Cardiovascular:  Negative for chest pain, palpitations and leg swelling.   Gastrointestinal:  Negative for abdominal distention, abdominal pain, diarrhea, nausea and vomiting.   Endocrine: Negative.    Genitourinary:  Negative for dysuria, flank pain and hematuria.   Musculoskeletal:  Negative for arthralgias, gait problem and myalgias.   Skin:  Negative for pallor.   Allergic/Immunologic: Negative for immunocompromised state.   Neurological:  Negative for dizziness, syncope, light-headedness, numbness and headaches.       Past Medical and Surgical History:     Past Medical History:   Diagnosis Date    Anxiety     Bipolar affective disorder, manic (HCC)     Hypercholesteremia     Hypertension        No past surgical history on file.    Meds/Allergies:    all medications and allergies reviewed    Allergies:   Allergies   Allergen Reactions    Pollen Extract Other (See Comments)       Social History:     Marital Status: Single    Substance Use History:   Social History     Substance and Sexual Activity   Alcohol Use Not Currently    Comment:  "Occasional      Social History     Tobacco Use   Smoking Status Every Day    Current packs/day: 0.50    Types: Cigarettes   Smokeless Tobacco Never     Social History     Substance and Sexual Activity   Drug Use Never    Comment: Illicit drugs:   none  - As per [facility]        Family History:    non-contributory    Physical Exam:     Vitals:   Blood Pressure: 152/86 (02/01/24 1105)  Pulse: 82 (02/01/24 1105)  Temperature: 98.6 °F (37 °C) (02/01/24 1105)  Temp Source: Tympanic (02/01/24 1105)  Respirations: 18 (02/01/24 1105)  Height: 5' 10\" (177.8 cm) (01/31/24 2200)  Weight - Scale: 111 kg (245 lb) (01/31/24 2200)  SpO2: 98 % (02/01/24 0749)    Physical Exam  Constitutional:       General: He is not in acute distress.  HENT:      Head: Normocephalic and atraumatic.   Eyes:      Extraocular Movements: Extraocular movements intact.      Pupils: Pupils are equal, round, and reactive to light.   Cardiovascular:      Rate and Rhythm: Normal rate and regular rhythm.      Pulses: Normal pulses.      Heart sounds: Normal heart sounds. No murmur heard.     No friction rub. No gallop.   Pulmonary:      Effort: Pulmonary effort is normal. No respiratory distress.      Breath sounds: Normal breath sounds.   Abdominal:      General: Abdomen is flat. There is no distension.      Palpations: Abdomen is soft.      Tenderness: There is no abdominal tenderness.   Musculoskeletal:         General: Normal range of motion.      Cervical back: Normal range of motion.   Skin:     General: Skin is warm and dry.   Neurological:      General: No focal deficit present.      Mental Status: He is alert.      Comments: CN II-XII intact  Ambulated without difficulty/ataxia         Additional Data:     Lab Results:     Results from last 7 days   Lab Units 02/01/24  0629   WBC Thousand/uL 5.83   HEMOGLOBIN g/dL 13.6   HEMATOCRIT % 40.1   PLATELETS Thousands/uL 272   NEUTROS PCT % 47   LYMPHS PCT % 37   MONOS PCT % 10   EOS PCT % 5     Results from " "last 7 days   Lab Units 02/01/24  0627   SODIUM mmol/L 141   POTASSIUM mmol/L 3.7   CHLORIDE mmol/L 104   CO2 mmol/L 27   BUN mg/dL 17   CREATININE mg/dL 1.02   ANION GAP mmol/L 10   CALCIUM mg/dL 8.8   ALBUMIN g/dL 4.4   TOTAL BILIRUBIN mg/dL 0.30   ALK PHOS U/L 128*   ALT U/L 35   AST U/L 20   GLUCOSE RANDOM mg/dL 92             No results found for: \"HGBA1C\"            Imaging: I have personally reviewed pertinent reports.      No orders to display       EKG, Pathology, and Other Studies Reviewed on Admission:   EKG: see above    ** Please Note: This note has been constructed using a voice recognition system. **    "

## 2024-02-01 NOTE — NURSING NOTE
New patient from [facility] on 201 commitment status. Upon admission patient appears depressed but calm and cooperative. Patient reported to ED c/o racing thoughts and impulsivity, thereby requesting a medication adjustment. Patient has a Hx of Bipolar, Anxiety disorder currently on Lexapro, Buspar, Atarax and Trileptal, also has HTN and Hyperlipidemia on Norvarc and Lipitor for same. Patient currently denies SI, HI and AVH. UDS neg. No prior Hx of Sz.

## 2024-02-02 PROCEDURE — 99232 SBSQ HOSP IP/OBS MODERATE 35: CPT | Performed by: PSYCHIATRY & NEUROLOGY

## 2024-02-02 RX ORDER — VILAZODONE HYDROCHLORIDE 10 MG/1
5 TABLET ORAL
Status: DISCONTINUED | OUTPATIENT
Start: 2024-02-02 | End: 2024-02-02

## 2024-02-02 RX ADMIN — TRAZODONE HYDROCHLORIDE 50 MG: 50 TABLET ORAL at 21:35

## 2024-02-02 RX ADMIN — DIVALPROEX SODIUM 750 MG: 250 TABLET, EXTENDED RELEASE ORAL at 10:00

## 2024-02-02 RX ADMIN — VILAZODONE HYDROCHLORIDE 5 MG: 10 TABLET ORAL at 10:10

## 2024-02-02 RX ADMIN — PANTOPRAZOLE SODIUM 20 MG: 20 TABLET, DELAYED RELEASE ORAL at 06:01

## 2024-02-02 RX ADMIN — CARIPRAZINE 3 MG: 3 CAPSULE, GELATIN COATED ORAL at 10:00

## 2024-02-02 RX ADMIN — ATORVASTATIN CALCIUM 40 MG: 40 TABLET, FILM COATED ORAL at 15:33

## 2024-02-02 RX ADMIN — NICOTINE 1 PATCH: 21 PATCH, EXTENDED RELEASE TRANSDERMAL at 10:12

## 2024-02-02 RX ADMIN — AMLODIPINE BESYLATE 10 MG: 5 TABLET ORAL at 10:00

## 2024-02-02 RX ADMIN — ASPIRIN 81 MG CHEWABLE TABLET 81 MG: 81 TABLET CHEWABLE at 10:00

## 2024-02-02 RX ADMIN — LISINOPRIL 10 MG: 10 TABLET ORAL at 10:00

## 2024-02-02 NOTE — PROGRESS NOTES
Progress Note - Behavioral Health   Moises Valdez 43 y.o. male MRN: [id number]  Unit/Bed#: [facility] 215-02 Encounter: [id number]    Assessment:  Principal Problem:    Bipolar affective disorder, currently manic, moderate (HCC)  Active Problems:    HTN (hypertension)    MILAGRO (obstructive sleep apnea)    Medical clearance for psychiatric admission    Elevated TSH      Plan:  --VPA level to be drawn at steady state; titrate accordingly  --Continue with psychiatric hospitalization  --Continue with individual, group, and milieu therapy  --Continue the following medications:  Current Facility-Administered Medications   Medication Dose Route Frequency    acetaminophen (TYLENOL) tablet 650 mg  650 mg Oral Q6H PRN    acetaminophen (TYLENOL) tablet 650 mg  650 mg Oral Q4H PRN    acetaminophen (TYLENOL) tablet 975 mg  975 mg Oral Q6H PRN    aluminum-magnesium hydroxide-simethicone (MAALOX) oral suspension 30 mL  30 mL Oral Q4H PRN    amLODIPine (NORVASC) tablet 10 mg  10 mg Oral Daily    aspirin chewable tablet 81 mg  81 mg Oral Daily    atorvastatin (LIPITOR) tablet 40 mg  40 mg Oral Daily With Dinner    haloperidol lactate (HALDOL) injection 2.5 mg  2.5 mg Intramuscular Q4H PRN Max 4/day    And    LORazepam (ATIVAN) injection 1 mg  1 mg Intramuscular Q4H PRN Max 4/day    And    benztropine (COGENTIN) injection 0.5 mg  0.5 mg Intramuscular Q4H PRN Max 4/day    haloperidol lactate (HALDOL) injection 5 mg  5 mg Intramuscular Q4H PRN Max 4/day    And    LORazepam (ATIVAN) injection 2 mg  2 mg Intramuscular Q4H PRN Max 4/day    And    benztropine (COGENTIN) injection 1 mg  1 mg Intramuscular Q4H PRN Max 4/day    benztropine (COGENTIN) tablet 1 mg  1 mg Oral Q4H PRN Max 6/day    bisacodyl (DULCOLAX) rectal suppository 10 mg  10 mg Rectal Daily PRN    cariprazine (VRAYLAR) capsule 3 mg  3 mg Oral Daily    hydrOXYzine HCL (ATARAX) tablet 50 mg  50 mg Oral Q6H PRN Max 4/day    Or    diphenhydrAMINE (BENADRYL) injection 50 mg  50 mg  Intramuscular Q6H PRN    divalproex sodium (DEPAKOTE ER) 24 hr tablet 750 mg  750 mg Oral Daily    ergocalciferol (VITAMIN D2) capsule 50,000 Units  50,000 Units Oral Weekly    haloperidol (HALDOL) tablet 1 mg  1 mg Oral Q6H PRN    haloperidol (HALDOL) tablet 2.5 mg  2.5 mg Oral Q4H PRN Max 4/day    haloperidol (HALDOL) tablet 5 mg  5 mg Oral Q4H PRN Max 4/day    hydrOXYzine HCL (ATARAX) tablet 100 mg  100 mg Oral Q6H PRN Max 4/day    Or    LORazepam (ATIVAN) injection 2 mg  2 mg Intramuscular Q6H PRN    hydrOXYzine HCL (ATARAX) tablet 25 mg  25 mg Oral Q6H PRN Max 4/day    lisinopril (ZESTRIL) tablet 10 mg  10 mg Oral Daily    nicotine (NICODERM CQ) 21 mg/24 hr TD 24 hr patch 1 patch  1 patch Transdermal Daily    nicotine polacrilex (NICORETTE) gum 2 mg  2 mg Oral Q2H PRN    pantoprazole (PROTONIX) EC tablet 20 mg  20 mg Oral Early Morning    polyethylene glycol (MIRALAX) packet 17 g  17 g Oral Daily PRN    senna-docusate sodium (SENOKOT S) 8.6-50 mg per tablet 1 tablet  1 tablet Oral Daily PRN    traZODone (DESYREL) tablet 50 mg  50 mg Oral HS PRN       Subjective: Patient was seen for continuation of care. Chart was reviewed and discussed with treatment team.     No acute behavioral events over the past 24 hours. Today, patient was seen and examined at bedside for continuation of care.     Today, the patient states that he is noticing that his thoughts are a little bit less racing. He is tolerating newly started Depakote w/o side effects.  Yesterday, he was complaining of a headache.  He does not necessarily attribute the headache to coming off of any of his serotonergic medications.  He is future oriented.  He is polite and cooperative.  We reiterated plan to draw Depakote level at steady state.    Patient denied adverse effects to their psychiatric medication regimen. Patient denied other new or worsening psychiatric symptoms/complaints at this time. Discussed the importance of continuing to take medications as  prescribed, as well as the importance of continuing to attend groups on the unit.     Psychiatric Review of Systems:  Medication adverse effects: none  Sleep: unchanged  Appetite: unchanged  Behavior over the past 24 hours: as per above    Vitals:  Vitals:    02/02/24 0747   BP: 118/78   Pulse: 80   Resp: 17   Temp: 97.7 °F (36.5 °C)   SpO2: 99%       Laboratory results:    I have personally reviewed all pertinent laboratory/tests results  Recent Results (from the past 48 hour(s))   Vitamin D 25 hydroxy    Collection Time: 02/01/24  5:56 AM   Result Value Ref Range    Vit D, 25-Hydroxy 19.9 (L) 30.0 - 100.0 ng/mL   Comprehensive metabolic panel    Collection Time: 02/01/24  6:27 AM   Result Value Ref Range    Sodium 141 135 - 147 mmol/L    Potassium 3.7 3.5 - 5.3 mmol/L    Chloride 104 96 - 108 mmol/L    CO2 27 21 - 32 mmol/L    ANION GAP 10 mmol/L    BUN 17 5 - 25 mg/dL    Creatinine 1.02 0.60 - 1.30 mg/dL    Glucose 92 65 - 140 mg/dL    Glucose, Fasting 92 65 - 99 mg/dL    Calcium 8.8 8.4 - 10.2 mg/dL    AST 20 13 - 39 U/L    ALT 35 7 - 52 U/L    Alkaline Phosphatase 128 (H) 34 - 104 U/L    Total Protein 6.9 6.4 - 8.4 g/dL    Albumin 4.4 3.5 - 5.0 g/dL    Total Bilirubin 0.30 0.20 - 1.00 mg/dL    eGFR 90 ml/min/1.73sq m   TSH, 3rd generation with Free T4 reflex    Collection Time: 02/01/24  6:27 AM   Result Value Ref Range    TSH 3RD GENERATON 5.950 (H) 0.450 - 4.500 uIU/mL   Lipid panel    Collection Time: 02/01/24  6:27 AM   Result Value Ref Range    Cholesterol 143 See Comment mg/dL    Triglycerides 496 (H) See Comment mg/dL    HDL, Direct 27 (L) >=40 mg/dL    LDL Calculated      Non-HDL-Chol (CHOL-HDL) 116 mg/dl   T4, free    Collection Time: 02/01/24  6:27 AM   Result Value Ref Range    Free T4 0.78 0.61 - 1.12 ng/dL   Urinalysis    Collection Time: 02/01/24  6:28 AM   Result Value Ref Range    Color, UA Yellow     Clarity, UA Clear     Specific Gravity, UA >=1.030 1.005 - 1.030    pH, UA 5.5 4.5, 5.0, 5.5,  6.0, 6.5, 7.0, 7.5, 8.0    Leukocytes, UA Negative Negative    Nitrite, UA Negative Negative    Protein, UA Negative Negative mg/dl    Glucose, UA Negative Negative mg/dl    Ketones, UA Negative Negative mg/dl    Urobilinogen, UA <2.0 <2.0 mg/dl mg/dl    Bilirubin, UA Negative Negative    Occult Blood, UA Negative Negative    RBC, UA None Seen None Seen, 2-4 /hpf    WBC, UA 0-1 (A) None Seen, 2-4, 5-60 /hpf    Epithelial Cells Occasional None Seen, Occasional /hpf    Bacteria, UA Occasional None Seen, Occasional /hpf   CBC and differential    Collection Time: 02/01/24  6:29 AM   Result Value Ref Range    WBC 5.83 4.31 - 10.16 Thousand/uL    RBC 4.63 3.88 - 5.62 Million/uL    Hemoglobin 13.6 12.0 - 17.0 g/dL    Hematocrit 40.1 36.5 - 49.3 %    MCV 87 82 - 98 fL    MCH 29.4 26.8 - 34.3 pg    MCHC 33.9 31.4 - 37.4 g/dL    RDW 13.1 11.6 - 15.1 %    MPV 9.7 8.9 - 12.7 fL    Platelets 272 149 - 390 Thousands/uL    nRBC 0 /100 WBCs    Neutrophils Relative 47 43 - 75 %    Immat GRANS % 0 0 - 2 %    Lymphocytes Relative 37 14 - 44 %    Monocytes Relative 10 4 - 12 %    Eosinophils Relative 5 0 - 6 %    Basophils Relative 1 0 - 1 %    Neutrophils Absolute 2.69 1.85 - 7.62 Thousands/µL    Immature Grans Absolute 0.02 0.00 - 0.20 Thousand/uL    Lymphocytes Absolute 2.15 0.60 - 4.47 Thousands/µL    Monocytes Absolute 0.59 0.17 - 1.22 Thousand/µL    Eosinophils Absolute 0.30 0.00 - 0.61 Thousand/µL    Basophils Absolute 0.08 0.00 - 0.10 Thousands/µL   Vitamin B12    Collection Time: 02/01/24  6:29 AM   Result Value Ref Range    Vitamin B-12 476 180 - 914 pg/mL   Folate    Collection Time: 02/01/24  6:29 AM   Result Value Ref Range    Folate 11.8 >5.9 ng/mL        Current Medications:  Current medications as per above. All medications have been reviewed.   Risks, benefits, alternatives, and possible side effects of patient's psychiatric medications were discussed with patient.     Mental Status Evaluation:  Appearance: Moderately  "kempt   motor: + Mild psychomotor agitation, no gait abnormalities  Behavior: Cooperative  Speech: pressured, volume is intermittently loud  Mood: \"pretty good\"  Affect: Mildly expansive  Thought Process: Linear thought Content: denies auditory hallucinations, denies visual hallucinations, denies delusions  Risk Potential: denies suicidal ideation, plan, or intent. Denies homicidal ideation  Sensorium: Oriented to person, place, time, and situation  Cognition: cognitive ability appears intact but was not quantitatively tested  Consciousness: alert and awake  Attention: currently intact  Insight: Good  Judgement: Fair      Progress Toward Goals & Illness Status: Patient is not at goal. They are not yet ready for discharge. The patient's condition currently requires active psychopharmacological medication management, interdisciplinary coordination with case management, and the utilization of adjunctive milieu and group therapy to augment psychopharmacological efficacy. The patient's risk of morbidity, and progression or decompensation of psychiatric disease, is higher without this current treatment.       This note has been constructed using a voice recognition system. There may be translation, syntax, or grammatical errors. If you have any questions, please contact the dictating provider.    "

## 2024-02-02 NOTE — NURSING NOTE
"Pt pleasant during interaction. Denies SI/HI/AVH. Pt denies depression. When asked about anxiety pt stated, \"Not anymore.\" Pt visible on the unit and compliant with medications. Denies unmet needs at this time.   "

## 2024-02-02 NOTE — DISCHARGE INSTR - OTHER ORDERS
[facility] CRISIS INFORMATION     The PEER LINE is a toll-free telephone number for people in [facility] who are seeking a listening ear for additional support in their recovery from mental illness.  The PEER LINE is peer-run and peer-friendly. You can call the Peer Line 24 hours a day. Phone: 8-627-XO-PEERS /([phone])     Emergency Services  Crisis Intervention Number: 4-561-613-0489  [address] [place] [address]    Text CONNECT to 354754 from anywhere in the [place], anytime, about any type of crisis.  A live, trained Crisis Counselor receives the text and lets you know that they are here to listen.  The volunteer Crisis Counselor will help you move from a hot moment to a cool moment.      The National [place] on Mental Illness (JOSE R) offers various education & support groups for you & your family.  For more information visit their website at   http://www.jose r-lv.org/.    Dial 2-1-1 to get connected/get help.  Free, confidential information & referral available 24/7: Aging Services, Child & Youth Services, Counseling, Education/Training, Food/Shelter/Clothing, Health Services, Parenting, Substance Abuse, Support Groups, Volunteer Opportunities, & much more.  Phone: 2-1-1 or 430-115-2420, Web: www.[employer], Email: 292@[facility].org    National Suicide Prevention Hotline  1-839.964.7666    National de Prevencion del Suicidio  1-560.965.8518    [place] Drug & Alcohol Helpline  1-117.699.5272    Crisis Text Line is free, 24/7 support for those in crisis. Text HOME to 129130 from anywhere in the [place] to text with a trained Crisis Counselor    [facility] provides funding to support multiple Recovery Centers in [facility].   These centers offer a safe, sober environment to those in recovery. A variety of programming including 12-Step Meetings, Yoga, Karaoke, Life Skills Workshops, etc. is offered at each location.    [facility]  [address]  [place]  [address]  [phone]  [email or web address]      [facility]  [address] [place] [address]  [phone]    Center  [address] [facility]  [place] [address]  [phone]  [email or web address]/index.[email or web address]/programs/hope-[email or web address]    [facility]  Nurturing families impacted by substance use  [address] [place] [address]  [phone]  [email or web address]    Recovery Center  [address] [place] [address]  [phone]  [facility]     [facility] [address]  [address] [address]  [phone]  Hours  9:00 am to 5:00 pm Monday thru Friday    Abstinence from addiction is only the beginning.  [facility] residents are encouraged to pursue meaningful lives with purpose at the Change on [address] [place] [address]. We provide a safe and sober environment which is staffed by people in recovery, who share similar experiences, and are willing to listen and assist people in early recovery. It takes a community to support the recovery process. This [facility] initiative recognizes and supports the efforts and investment of those personally in recovery as well as those supporting their efforts.    [facility]  The [facility] on [address] [place] [address] offers a safe environment to those seeking recovery and/or who are part of the treatment continuum.    Although we are not a treatment facility, we are able to assist those in need, refer members of the center to community resources as needed. We encourage and guide members to pursue meaningful lives with purpose at the [facility] on [address] [place] [address] .    Our hope is that they will find inspiration, encouragement, support through the examples of our volunteers and staff at the center. It takes effort and a dedicated community to support the recovery process.      [facility] Drop-In Centers  The Drop-In Centers are open to all mental health consumers in [facility] who are interested in meeting people and making new friends.  They provide a friendly social atmosphere with scheduled daily activities including games, arts & crafts, discussion and education groups, vocational activities, and much more. Light refreshments are served daily. Fosters social growth by providing structured social rehabilitation programming in a safe, culturally sensitive environment, to individuals in recovery from mental illness. The locations are:    [facility]In Center - operated by [facility]   [address] [facility] [address] : [phone]   Hours of Operation:  Monday 3:00 PM - 8:00 PM  Tuesday 12:00 PM - 8:00 PM   Wednesday 3:00 PM - 8:00 PM  Thursday 12:00 PM - 8:00 PM   Friday 3:00 PM - 8:00 PM   Saturday [facility] Drop-In Center - operated by [address] [place]: [phone]  Monday through Friday from 9:00am to 7:00pm  ____________________    DRUG & ALCOHOL Recovery Centers     [facility] provides funding to support three Recovery Centers in [facility]. These centers offer a safe, sober environment to those in recovery. A variety of programming including 12-Step Meetings, Yoga, Karaoke, Life Skills Workshops, art activities, computer access for job searches, job applications, sober events, holiday meals, etc. is offered at each location.    [address]  [place] [address]  [phone]  [email or web address]/programs/[email or web address]  Nurturing families impacted by substance use  [address]  [place] [address]  [phone]  [email or web address]  [facility], [facility]  Operates November 15, 2021 - April 15, 2022 Provides overnight shelter, evening meal served   5:30pm to 7:30pm, and breakfast to go. Guests are required to wear masks at all times. Showers   will be available daily.  Address:  [address] [place] [address]  [facility] [place] [address]  Eligibility: Homeless men and women who have no other shelter options; Unable to serve   families  Hours: Monday through Sunday, 5:00pm to 7:00am  Phone: [phone]  Website: [email or web address]    [facility]  December 1, 2021 through March 31, 2022 An emergency, cold-weather shelter for adult men and   women, available on a walk-in basis before posted curfew hours. Snacks served, hot beverages,   hygiene kits, clothing closet, showers, case management available. Covid-19 Protocols include   temperature screenings, testing if symptoms present, quarantine protocols and masking protocols.  Address:  [address] [place] [address]  Eligibility: Homeless men and women who have no other shelter options; unable to serve families  Hours: Every day of the week. Opens: 7:00pm Closes: 7:00am (December 1, 2021 through March 31, 2022). No entry past 9:00pm.  Phone: [phone]  Website: [email or web address]

## 2024-02-02 NOTE — PLAN OF CARE
Problem: DISCHARGE PLANNING  Goal: Discharge to home or other facility with appropriate resources  Description: INTERVENTIONS:  - Identify barriers to discharge w/patient and caregiver  - Arrange for needed discharge resources and transportation as appropriate  - Identify discharge learning needs (meds, wound care, etc.)  - Arrange for interpretive services to assist at discharge as needed  - Refer to Case Management Department for coordinating discharge planning if the patient needs post-hospital services based on physician/advanced practitioner order or complex needs related to functional status, cognitive ability, or social support system  Outcome: Progressing  Note: Pt met with CM to review and sign ROIs and complete intake. Pt read and signed treatment plan.

## 2024-02-02 NOTE — PROGRESS NOTES
02/02/24 1290   Team Meeting   Meeting Type Daily Rounds   Team Members Present   Team Members Present Physician;Nurse;;Other (Discipline and Name)   Physician Team Member Dr. Muniz / Dr. Gamez / DEMI Prajapati   Nursing Team Member Michelle / Bety   Care Management Team Member Justyna / Sanjeev / Les   Other (Discipline and Name) Gildardo (Group Facilitator)   Patient/Family Present   Patient Present No   Patient's Family Present No       Status: Pt is pleasant and denying SI, HI, and AVH. Pt is not having racing thoughts. Pt reports using humor and is attending groups. Pt slept through the night.     Medication: Pt received PRN Tylenol and Trazodone for headache and sleep.     D/C: No discharge date.

## 2024-02-02 NOTE — ED ATTENDING ATTESTATION
1/30/2024  IEstevan DO, saw and evaluated the patient. I have discussed the patient with the resident/non-physician practitioner and agree with the resident's/non-physician practitioner's findings, Plan of Care, and MDM as documented in the resident's/non-physician practitioner's note, except where noted. All available labs and Radiology studies were reviewed.  I was present for key portions of any procedure(s) performed by the resident/non-physician practitioner and I was immediately available to provide assistance.       At this point I agree with the current assessment done in the Emergency Department.  I have conducted an independent evaluation of this patient a history and physical is as follows:    42 yo M w/ bipolar disorder coming into the ED for eval of mental health crisis, states wishes to be admitted voluntarily. C/o impulsive and intrusive thoughts, racing thoughts, poor decision making. For example, he and his spouse at bedside mentioned him suddenly wanting to move across the state of [place] to live closer to his mother (with whom he doesn't have a strong relationship).    PE:  The patient is well appearing, non-toxic, in NAD. Head: normocephalic, atraumatic. HEENT: mucous membranes moist.  Lungs: CTA b/l, no resp distress. Heart: RRR. No M/R/G. Abdomen: NT, ND, no R/R/G. Neuro: CN2-12 intact, GCS 15. Normal strength and sensation, normal speech and gait. Cap refill < 2 sec, skin warm and dry. No rashes or lesions.  Psych: affect is flat, bizarre.    Pt medically cleared for inpatient psychiatric treatment.  Case signed out at 7am to Dr Calvillo, pending crisis evaluation.    ED Course         Critical Care Time  Procedures

## 2024-02-02 NOTE — DISCHARGE INSTR - APPOINTMENTS
You have confirmed and will be discharged to address [address], [place] [address].  You have confirmed and will be contacted at phone number [phone].  Your  while you were at [facility] was Dk LUU who can be reached at phone number [phone] and fax number [phone].    Courtney, or Fiordaliza, our Behavioral Health Nurse Navigators, will be calling you after your discharge, on the phone number that you provided.  They will be available as an additional support, if needed.   If you wish to speak with one of them, you may contact Courtney at [phone] or Fiordaliza at [phone].

## 2024-02-02 NOTE — NURSING NOTE
Patient received PRN Trazodone 50 mg PO as sleep aid. Patient currently in bed, appears asleep, breathing freely on room air, even bilateral chest expansion, no distress observed.
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
## 2024-02-02 NOTE — CASE MANAGEMENT
CM called Pt's girlfriend Kandice to inform her of the Pt admission. CM introduced self and discussed role. CM stated that they don't have a discharge date at this time. Kandice stated that the Pt had told her he was leaving on Monday. CM stated that they confirmed with the provider and the Pt is not scheduled to be discharged on Monday. Kandice stated that the Pt is very dependant on her. Kandice stated that she feels the Pt would benefit from a rep payee. CM discussed rep payee process and that he would need to be willing to sign for a rep payee. CM stated that the Pt is interested in PHP and they will refer him to [facility] [employer]. CM stated that they explained that it was in [place] and the Pt stated that he can make that work. CM stated that he goes through [facility] and that they also have programs he may want to take advatage of. Kandice stated that the Pt needed to get numbers from his phone for FMLA. CM stated that they will have someone assist him with getting the numbers he needs. CM stated that they will keep her posted on his discharge planning.

## 2024-02-02 NOTE — PROGRESS NOTES
02/02/24 1000   Activity/Group Checklist   Group Admission/Discharge  (Relapse prevention plan)   Attendance Attended   Attendance Duration (min) 0-15   Interactions Interacted appropriately   Affect/Mood Appropriate   Goals Achieved Identified relapse prevention strategies  (Completed relapse prevention plan with assistance from this writer. Identified warning signs, coping strategies, and supports. Discussed resources for relapse prevention and management.)

## 2024-02-02 NOTE — NURSING NOTE
Pt denies SI/HI/AVH. He appears anxious but states this is baseline. He is cooperative with tx and compliant with medications. Pt has participated in groups today and been visible on unit.

## 2024-02-02 NOTE — PROGRESS NOTES
02/01/24 0750   Team Meeting   Meeting Type Daily Rounds   Team Members Present   Team Members Present Physician;Nurse;;Other (Discipline and Name)   Physician Team Member Dr. Muniz / Dr. Gamez / DEMI Prajapati   Nursing Team Member Michelle / Demarcus   Care Management Team Member Justyna / Sanjeev / Les   Other (Discipline and Name) Gildardo (Group Facilitator)   Patient/Family Present   Patient Present No   Patient's Family Present No       Status: Pt is a 201 from [facility] ED with impulsivity and racing thoughts. Pt is calm and pleasant on the unit. Pt is denying SI, HI, and AVH. Pt slept through the night.   Readmit score: 22(yellow), AUDIT: 0, PAWSS: 0, BAT: 0, UDS: Negative    Medication: No medication changes and no PRNs given.     D/C: No discharge date.

## 2024-02-02 NOTE — PROGRESS NOTES
02/01/24 1150   Team Meeting   Meeting Type Tx Team Meeting   Initial Conference Date 02/01/24   Next Conference Date 03/02/24   Team Members Present   Team Members Present Physician;Nurse;   Physician Team Member Dr. Gamez   Nursing Team Member Demarcus   Care Management Team Member Justyna   Patient/Family Present   Patient Present No  (Pt declined to meet with treatment team.)   Patient's Family Present No       Reviewed diagnosis of bipolar affective disorder, currently manic, moderate.   Discussed short term goals of decrease in impulsive/risky behaviors.  No discharge date set at this time.  All parties are in agreement and treatment plan was signed.

## 2024-02-03 PROCEDURE — 99232 SBSQ HOSP IP/OBS MODERATE 35: CPT | Performed by: PSYCHIATRY & NEUROLOGY

## 2024-02-03 RX ADMIN — ATORVASTATIN CALCIUM 40 MG: 40 TABLET, FILM COATED ORAL at 15:35

## 2024-02-03 RX ADMIN — AMLODIPINE BESYLATE 10 MG: 5 TABLET ORAL at 09:26

## 2024-02-03 RX ADMIN — NICOTINE 1 PATCH: 21 PATCH, EXTENDED RELEASE TRANSDERMAL at 09:28

## 2024-02-03 RX ADMIN — CARIPRAZINE 3 MG: 3 CAPSULE, GELATIN COATED ORAL at 09:26

## 2024-02-03 RX ADMIN — DIVALPROEX SODIUM 750 MG: 250 TABLET, EXTENDED RELEASE ORAL at 09:26

## 2024-02-03 RX ADMIN — LISINOPRIL 10 MG: 10 TABLET ORAL at 09:26

## 2024-02-03 RX ADMIN — TRAZODONE HYDROCHLORIDE 50 MG: 50 TABLET ORAL at 22:01

## 2024-02-03 RX ADMIN — ASPIRIN 81 MG CHEWABLE TABLET 81 MG: 81 TABLET CHEWABLE at 09:26

## 2024-02-03 RX ADMIN — PANTOPRAZOLE SODIUM 20 MG: 20 TABLET, DELAYED RELEASE ORAL at 06:00

## 2024-02-03 NOTE — TREATMENT TEAM
02/03/24 1000   Team Meeting   Meeting Type Daily Rounds   Team Members Present   Team Members Present Physician;Nurse   Physician Team Member Holly Kevin   Nursing Team Member Bety   Patient/Family Present   Patient Present No   Patient's Family Present No     Daily Rounds: Pt denies all symptoms, reports feeling better.  Received Trazodone for sleep.     Depakote level 2/4/24 at 0600

## 2024-02-03 NOTE — NURSING NOTE
Pt calm and cooperative. Pt denies anxiety or depression. Denies SI/HI/AVH. Remains adherent with scheduled medication. Reports feeling better. Denies any needs at this time.

## 2024-02-03 NOTE — PROGRESS NOTES
"Progress Note - Behavioral Health   Moises Valdez 43 y.o. male MRN: [id number]  Unit/Bed#: [facility] 209-01 Encounter: [id number]    Assessment/Plan   Principal Problem:    Bipolar affective disorder, currently manic, moderate (HCC)  Active Problems:    HTN (hypertension)    MILAGRO (obstructive sleep apnea)    Medical clearance for psychiatric admission    Elevated TSH      Continue medications as noted below.  Continue to promote patient participation in group therapy, milieu therapy, occupational therapy  Continue medical management by primary team.  Discharge disposition:  pending    Subjective:  The patient was evaluated this morning for continuity of care and no acute distress noted throughout the evaluation. Over the past 24 hours staff noted that Moises has had improvement in his psychiatric symptoms and reportedly feeling better. Received Trazodone for sleep. Patient has been medication adherent.    Today on evaluation, Moises states that he is feeling \"good\" in terms of mood. Notes that he was recently started on Depakote. Reports tiredness today and feeling sleepy. He denies acute issues or concerns at this time.  Reports improvement in his mood.  Denies suicidal ideation, thoughts of wanting to harm self.  Denies homicidal ideation, thoughts of wanting to harm others.  Denies perceptual disturbances such as auditory hallucinations and visual hallucinations.  Reports feeling safe on the unit, denies paranoid symptoms.  Reports no racing thoughts today.      Psychiatric Review of Systems:  Behavior over the last 24 hours:  improved  Sleep: improved  Appetite: normal  Medication side effects: No   ROS: no complaints, all other systems are negative    Current Medications:  Current Facility-Administered Medications   Medication Dose Route Frequency Provider Last Rate    acetaminophen  650 mg Oral Q6H PRN Candice Osorio MD      acetaminophen  650 mg Oral Q4H PRN Cadnice Osorio MD      acetaminophen  975 mg Oral Q6H PRN " Candice Osorio MD      aluminum-magnesium hydroxide-simethicone  30 mL Oral Q4H PRN Candice Osorio MD      amLODIPine  10 mg Oral Daily Candice Osorio MD      aspirin  81 mg Oral Daily Candice Osorio MD      atorvastatin  40 mg Oral Daily With Dinner Michele Bauer, PA-C      haloperidol lactate  2.5 mg Intramuscular Q4H PRN Max 4/day Candice Osorio MD      And    LORazepam  1 mg Intramuscular Q4H PRN Max 4/day Candice Osorio MD      And    benztropine  0.5 mg Intramuscular Q4H PRN Max 4/day Candice Osorio MD      haloperidol lactate  5 mg Intramuscular Q4H PRN Max 4/day Candice Osorio MD      And    LORazepam  2 mg Intramuscular Q4H PRN Max 4/day Candice Osorio MD      And    benztropine  1 mg Intramuscular Q4H PRN Max 4/day Candice Osorio MD      benztropine  1 mg Oral Q4H PRN Max 6/day Candice Osorio MD      bisacodyl  10 mg Rectal Daily PRN Candice Osorio MD      cariprazine  3 mg Oral Daily [facility], DO      hydrOXYzine HCL  50 mg Oral Q6H PRN Max 4/day Candice Osorio MD      Or    diphenhydrAMINE  50 mg Intramuscular Q6H PRN Candice Osorio MD      divalproex sodium  750 mg Oral Daily [facility], DO      ergocalciferol  50,000 Units Oral Weekly Michele Bauer, PA-MAYLIN      haloperidol  1 mg Oral Q6H PRN Candice Osorio MD      haloperidol  2.5 mg Oral Q4H PRN Max 4/day Candice Osorio MD      haloperidol  5 mg Oral Q4H PRN Max 4/day Candice Osorio MD      hydrOXYzine HCL  100 mg Oral Q6H PRN Max 4/day Candice Osorio MD      Or    LORazepam  2 mg Intramuscular Q6H PRN Candice Osorio MD      hydrOXYzine HCL  25 mg Oral Q6H PRN Max 4/day Candice Osorio MD      lisinopril  10 mg Oral Daily Candice Osorio MD      nicotine  1 patch Transdermal Daily Pj Gamez,       nicotine polacrilex  2 mg Oral Q2H PRN Pj Gamez,       pantoprazole  20 mg Oral Early Morning Candice Osorio MD      polyethylene glycol  17 g  "Oral Daily PRN Candice Osorio MD      senna-docusate sodium  1 tablet Oral Daily PRN Candice Osorio MD      traZODone  50 mg Oral HS PRSELENE Oosrio MD         Behavioral Health Medications: all current active meds have been reviewed and continue current psychiatric medications.    Vital signs in last 24 hours:  Temp:  [97.4 °F (36.3 °C)-98.4 °F (36.9 °C)] 98.4 °F (36.9 °C)  HR:  [84-92] 92  Resp:  [17-18] 18  BP: (127-128)/(80) 128/80    Laboratory results:  I have personally reviewed all pertinent laboratory/tests results.  Most Recent Labs:   Lab Results   Component Value Date    WBC 5.83 02/01/2024    RBC 4.63 02/01/2024    HGB 13.6 02/01/2024    HCT 40.1 02/01/2024     02/01/2024    RDW 13.1 02/01/2024    NEUTROABS 2.69 02/01/2024    SODIUM 141 02/01/2024    K 3.7 02/01/2024     02/01/2024    CO2 27 02/01/2024    BUN 17 02/01/2024    CREATININE 1.02 02/01/2024    GLUC 92 02/01/2024    GLUF 92 02/01/2024    CALCIUM 8.8 02/01/2024    AST 20 02/01/2024    ALT 35 02/01/2024    ALKPHOS 128 (H) 02/01/2024    TP 6.9 02/01/2024    ALB 4.4 02/01/2024    TBILI 0.30 02/01/2024    CHOLESTEROL 143 02/01/2024    HDL 27 (L) 02/01/2024    TRIG 496 (H) 02/01/2024    LDLCALC  02/01/2024      Comment:      Calculated LDL invalid, triglycerides >400 mg/dl  This screening LDL is a calculated result.   It does not have the accuracy of the Direct Measured LDL in the monitoring of patients with hyperlipidemia and/or statin therapy.   Direct Measure LDL ([id number]) must be ordered separately in these patients.    NONHDLC 116 02/01/2024    [id number] 5.950 (H) 02/01/2024    FREET4 0.78 02/01/2024         Mental Status Evaluation:    Appearance:  age appropriate, casually dressed   Behavior:  pleasant, cooperative   Speech:  normal rate and volume   Mood:  \"good\"   Affect:  expansive   Thought Process:  goal directed   Associations: intact associations   Thought Content:  no overt delusions   Perceptual " Disturbances: no auditory hallucinations, no visual hallucinations, does not appear responding to internal stimuli   Risk Potential: Suicidal ideation - None  Homicidal ideation - None  Potential for aggression - No   Sensorium:  oriented to person, place, and time/date   Memory:  recent and remote memory grossly intact   Consciousness:  alert and awake   Attention/Concentration: attention span and concentration are age appropriate   Insight:  improving   Judgment: improving   Gait/Station: normal gait/station   Motor Activity: no abnormal movements     Progress Toward Goals: progressing    Recommended Treatment:   See above for assessment and plan.     Risks, benefits and possible side effects of Medications:   Risks, benefits, and possible side effects of medications explained to patient and patient verbalizes understanding.      Treatment discussed with nursing staff.    This note has been constructed using a voice recognition system.  There may be translation, syntax, or grammatical errors. If you have any questions, please contact the dictating provider.    Rufus Kevin MD

## 2024-02-03 NOTE — NURSING NOTE
Pt requested and received trazodone 50 mg PO for sleep @ 2135. Upon follow up pt is sleeping. PRN was effective.

## 2024-02-03 NOTE — NURSING NOTE
Patient was withdrawn to his room and resting in bed. Patient states that he is feeling sleepy today and attempted to take a nap. Denies SI, HI, AVH, and paranoia. Reports adequate sleep and appetite. Adherent to taking routine meds.

## 2024-02-04 LAB — VALPROATE SERPL-MCNC: 28 UG/ML (ref 50–100)

## 2024-02-04 PROCEDURE — 99232 SBSQ HOSP IP/OBS MODERATE 35: CPT | Performed by: PSYCHIATRY & NEUROLOGY

## 2024-02-04 PROCEDURE — 80164 ASSAY DIPROPYLACETIC ACD TOT: CPT | Performed by: PSYCHIATRY & NEUROLOGY

## 2024-02-04 RX ORDER — IBUPROFEN 400 MG/1
400 TABLET ORAL EVERY 8 HOURS PRN
Status: DISCONTINUED | OUTPATIENT
Start: 2024-02-04 | End: 2024-02-06

## 2024-02-04 RX ADMIN — PANTOPRAZOLE SODIUM 20 MG: 20 TABLET, DELAYED RELEASE ORAL at 09:09

## 2024-02-04 RX ADMIN — DIVALPROEX SODIUM 750 MG: 250 TABLET, EXTENDED RELEASE ORAL at 09:10

## 2024-02-04 RX ADMIN — ASPIRIN 81 MG CHEWABLE TABLET 81 MG: 81 TABLET CHEWABLE at 09:10

## 2024-02-04 RX ADMIN — AMLODIPINE BESYLATE 10 MG: 5 TABLET ORAL at 09:10

## 2024-02-04 RX ADMIN — NICOTINE 1 PATCH: 21 PATCH, EXTENDED RELEASE TRANSDERMAL at 09:13

## 2024-02-04 RX ADMIN — IBUPROFEN 400 MG: 400 TABLET, FILM COATED ORAL at 20:54

## 2024-02-04 RX ADMIN — ATORVASTATIN CALCIUM 40 MG: 40 TABLET, FILM COATED ORAL at 16:53

## 2024-02-04 RX ADMIN — LISINOPRIL 10 MG: 10 TABLET ORAL at 09:10

## 2024-02-04 RX ADMIN — ACETAMINOPHEN 975 MG: 325 TABLET ORAL at 18:04

## 2024-02-04 RX ADMIN — ACETAMINOPHEN 650 MG: 325 TABLET ORAL at 12:28

## 2024-02-04 RX ADMIN — CARIPRAZINE 3 MG: 3 CAPSULE, GELATIN COATED ORAL at 09:10

## 2024-02-04 RX ADMIN — TRAZODONE HYDROCHLORIDE 50 MG: 50 TABLET ORAL at 21:50

## 2024-02-04 NOTE — TREATMENT TEAM
02/04/24 0800   Team Meeting   Meeting Type Daily Rounds   Team Members Present   Team Members Present Physician;Nurse   Physician Team Member Holly Kevin   Nursing Team Member Bety   Patient/Family Present   Patient Present No   Patient's Family Present No     Daily Rounds:  Pt denies SI/HI/AVH.  Cooperative with room change yesterday.  Slept overnight.      Depakote level 28, will increase dose today.

## 2024-02-04 NOTE — NURSING NOTE
Pt pleasant during conversation. Denies SI/HI/AVH. Denies anxiety or depression. Reports ongoing improvement. Pt visible on the unit this evening watching TV with peers. Pt compliant with medications. Pt reports adequate sleep and appetite. Pt reports tooth pain and plans to follow up with a dentist after discharge. Pt is looking forward to going home and seeing his girlfriend and playing his play station. Denies unmet needs at this time.

## 2024-02-04 NOTE — NURSING NOTE
"Pt withdrawn to room this morning observed resting in bed. Pt stated \"I'm good.\" Denies SI/HI/AVH. Pt denies any needs at present.   "

## 2024-02-04 NOTE — NURSING NOTE
Patient requested and received trazodone 50 mg PRN at 2201 for sleep disturbance. Upon reassessment, PRN was effective.

## 2024-02-04 NOTE — PROGRESS NOTES
"Progress Note - Behavioral Health   Moises Valdez 43 y.o. male MRN: [id number]  Unit/Bed#: [facility] 209-01 Encounter: [id number]    Assessment/Plan   Principal Problem:    Bipolar affective disorder, currently manic, moderate (HCC)  Active Problems:    HTN (hypertension)    MILAGRO (obstructive sleep apnea)    Medical clearance for psychiatric admission    Elevated TSH      Patient does not wish to increase the dose of Depakote at this time.  Current VPA level of 28.  continue medications as noted below.  Continue to promote patient participation in group therapy, milieu therapy, occupational therapy  Continue medical management by primary team.  Discharge disposition: Pending    Subjective:  The patient was evaluated this morning for continuity of care and no acute distress noted throughout the evaluation. Over the past 24 hours staff noted that Moises requested trazodone to help him sleep, which was effective.  Patient has been medication adherent.    Today on evaluation, Moises states that he feels \"fine\" in terms of his mood, states that he does not have mood swings, racing thoughts, irritability, anger, increased mood, and states that he feels more grounded.  When discussing medication, he does not wish to increase the dose of his Depakote at this time stating that he feels fine on the current dose.  He denies feeling depressed.  He denies feeling anxious.  Denies suicidal ideation, homicidal ideation.  Denies perceptual disturbances.  Denies feelings of paranoia.  Denies thoughts consistent with delusional thoughts.  Reports improvement in tiredness.    Psychiatric Review of Systems:  Behavior over the last 24 hours:  unchanged  Sleep: difficulty falling asleep  Appetite: normal  Medication side effects: No   ROS: no complaints, all other systems are negative    Current Medications:  Current Facility-Administered Medications   Medication Dose Route Frequency Provider Last Rate    acetaminophen  650 mg Oral Q6H PRN Candice DAY" MD Jo      acetaminophen  650 mg Oral Q4H PRN Candice Osorio MD      acetaminophen  975 mg Oral Q6H PRN Candice Osorio MD      aluminum-magnesium hydroxide-simethicone  30 mL Oral Q4H PRN Candice Osorio MD      amLODIPine  10 mg Oral Daily Candice Osorio MD      aspirin  81 mg Oral Daily Candice Osorio MD      atorvastatin  40 mg Oral Daily With Dinner Michele Bauer PA-C      haloperidol lactate  2.5 mg Intramuscular Q4H PRN Max 4/day Candice Osorio MD      And    LORazepam  1 mg Intramuscular Q4H PRN Max 4/day Candice Osorio MD      And    benztropine  0.5 mg Intramuscular Q4H PRN Max 4/day Candice Osorio MD      haloperidol lactate  5 mg Intramuscular Q4H PRN Max 4/day Candice Osorio MD      And    LORazepam  2 mg Intramuscular Q4H PRN Max 4/day Candice Osorio MD      And    benztropine  1 mg Intramuscular Q4H PRN Max 4/day Candice Osorio MD      benztropine  1 mg Oral Q4H PRN Max 6/day Candice Osorio MD      bisacodyl  10 mg Rectal Daily PRN Candice Osorio MD      cariprazine  3 mg Oral Daily Pj Gamez, DO      hydrOXYzine HCL  50 mg Oral Q6H PRN Max 4/day Candice Osorio MD      Or    diphenhydrAMINE  50 mg Intramuscular Q6H PRN Candice Osorio MD      divalproex sodium  750 mg Oral Daily Pj Gamez, DO      ergocalciferol  50,000 Units Oral Weekly Michele Bauer, DEMI      haloperidol  1 mg Oral Q6H PRN Candice Osorio MD      haloperidol  2.5 mg Oral Q4H PRN Max 4/day Candice Osorio MD      haloperidol  5 mg Oral Q4H PRN Max 4/day Candice Osorio MD      hydrOXYzine HCL  100 mg Oral Q6H PRN Max 4/day Candice Osorio MD      Or    LORazepam  2 mg Intramuscular Q6H PRN Candice Osorio MD      hydrOXYzine HCL  25 mg Oral Q6H PRN Max 4/day Candice Osorio MD      lisinopril  10 mg Oral Daily Candice Osorio MD      nicotine  1 patch Transdermal Daily Pj Gamez DO      nicotine polacrilex  2 mg Oral Q2H PRN Pj  "Reyes Gamez,       pantoprazole  20 mg Oral Early Morning Candice Osorio MD      polyethylene glycol  17 g Oral Daily PRN Candiec Osorio MD      senna-docusate sodium  1 tablet Oral Daily PRN Candice Osorio MD      traZODone  50 mg Oral HS PRN Candice Osorio MD         Behavioral Health Medications: all current active meds have been reviewed and continue current psychiatric medications.    Vital signs in last 24 hours:  Temp:  [98 °F (36.7 °C)-98.4 °F (36.9 °C)] 98 °F (36.7 °C)  HR:  [91-92] 91  Resp:  [18] 18  BP: (128-136)/(80-94) 136/94    Laboratory results:  I have personally reviewed all pertinent laboratory/tests results.  Most Recent Labs:   Lab Results   Component Value Date    WBC 5.83 02/01/2024    RBC 4.63 02/01/2024    HGB 13.6 02/01/2024    HCT 40.1 02/01/2024     02/01/2024    RDW 13.1 02/01/2024    NEUTROABS 2.69 02/01/2024    SODIUM 141 02/01/2024    K 3.7 02/01/2024     02/01/2024    CO2 27 02/01/2024    BUN 17 02/01/2024    CREATININE 1.02 02/01/2024    GLUC 92 02/01/2024    GLUF 92 02/01/2024    CALCIUM 8.8 02/01/2024    AST 20 02/01/2024    ALT 35 02/01/2024    ALKPHOS 128 (H) 02/01/2024    TP 6.9 02/01/2024    ALB 4.4 02/01/2024    TBILI 0.30 02/01/2024    CHOLESTEROL 143 02/01/2024    HDL 27 (L) 02/01/2024    TRIG 496 (H) 02/01/2024    LDLCALC  02/01/2024      Comment:      Calculated LDL invalid, triglycerides >400 mg/dl  This screening LDL is a calculated result.   It does not have the accuracy of the Direct Measured LDL in the monitoring of patients with hyperlipidemia and/or statin therapy.   Direct Measure LDL (LGC662) must be ordered separately in these patients.    NONHDLC 116 02/01/2024    VALPROICTOT 28 (L) 02/04/2024    [id number] 5.950 (H) 02/01/2024    FREET4 0.78 02/01/2024         Mental Status Evaluation:    Appearance:  age appropriate, casually dressed   Behavior:  pleasant, cooperative, guarded   Speech:  normal rate and volume   Mood:  \"Stable\" "   Affect:  constricted   Thought Process:  goal directed   Associations: intact associations   Thought Content:  no overt delusions   Perceptual Disturbances: no auditory hallucinations, no visual hallucinations, does not appear responding to internal stimuli   Risk Potential: Suicidal ideation - None  Homicidal ideation - None  Potential for aggression - No   Sensorium:  oriented to person, place, and time/date   Memory:  recent and remote memory grossly intact   Consciousness:  alert and awake   Attention/Concentration: attention span and concentration are age appropriate   Insight:  limited   Judgment: limited   Gait/Station: normal gait/station   Motor Activity: no abnormal movements     Progress Toward Goals: progressing    Recommended Treatment:   See above for assessment and plan.     Risks, benefits and possible side effects of Medications:   Risks, benefits, and possible side effects of medications explained to patient and patient verbalizes understanding.      Treatment discussed with nursing staff.    This note has been constructed using a voice recognition system.  There may be translation, syntax, or grammatical errors. If you have any questions, please contact the dictating provider.    Rufus Kevin MD     [place]

## 2024-02-05 PROCEDURE — 99232 SBSQ HOSP IP/OBS MODERATE 35: CPT | Performed by: PSYCHIATRY & NEUROLOGY

## 2024-02-05 RX ADMIN — DIVALPROEX SODIUM 750 MG: 250 TABLET, EXTENDED RELEASE ORAL at 08:20

## 2024-02-05 RX ADMIN — ATORVASTATIN CALCIUM 40 MG: 40 TABLET, FILM COATED ORAL at 16:09

## 2024-02-05 RX ADMIN — IBUPROFEN 400 MG: 400 TABLET, FILM COATED ORAL at 19:53

## 2024-02-05 RX ADMIN — ACETAMINOPHEN 975 MG: 325 TABLET ORAL at 08:21

## 2024-02-05 RX ADMIN — LISINOPRIL 10 MG: 10 TABLET ORAL at 08:21

## 2024-02-05 RX ADMIN — ACETAMINOPHEN 975 MG: 325 TABLET ORAL at 16:09

## 2024-02-05 RX ADMIN — PANTOPRAZOLE SODIUM 20 MG: 20 TABLET, DELAYED RELEASE ORAL at 05:38

## 2024-02-05 RX ADMIN — CARIPRAZINE 3 MG: 3 CAPSULE, GELATIN COATED ORAL at 08:20

## 2024-02-05 RX ADMIN — NICOTINE 1 PATCH: 21 PATCH, EXTENDED RELEASE TRANSDERMAL at 08:20

## 2024-02-05 RX ADMIN — ASPIRIN 81 MG CHEWABLE TABLET 81 MG: 81 TABLET CHEWABLE at 08:21

## 2024-02-05 RX ADMIN — AMLODIPINE BESYLATE 10 MG: 5 TABLET ORAL at 08:20

## 2024-02-05 RX ADMIN — IBUPROFEN 400 MG: 400 TABLET, FILM COATED ORAL at 09:45

## 2024-02-05 RX ADMIN — TRAZODONE HYDROCHLORIDE 50 MG: 50 TABLET ORAL at 22:22

## 2024-02-05 NOTE — PROGRESS NOTES
02/05/24 0830   Team Meeting   Meeting Type Daily Rounds   Team Members Present   Team Members Present Physician;Nurse;;Other (Discipline and Name)   Physician Team Member Dr. Muniz / Dr. Gamez / DEMI Prajapati / PA Student   Nursing Team Member Michelle / Demarcus   Care Management Team Member Justyna / Sanjeev / Les / Iram   Other (Discipline and Name) Sigmund (Group Facilitator)   Patient/Family Present   Patient Present No   Patient's Family Present No       Status: Pt is denying SI, HI, and AVH. Pt is pleasant and denying anxiety and depression. Pt is visible and attending groups. Pt slept through the night.     Medication: Pt received PRN Trazodone for sleep.     D/C: Pt is scheduled to discharge tomorrow 2/6/2024.

## 2024-02-05 NOTE — PLAN OF CARE
Pt regularly attends therapeutic groups and other unit activities    Problem: Ineffective Coping  Goal: Identifies healthy coping skills  Outcome: Progressing  Goal: Participates in unit activities  Description: Interventions:  - Provide therapeutic environment   - Provide required programming   - Redirect inappropriate behaviors   Outcome: Progressing

## 2024-02-05 NOTE — NURSING NOTE
Pt received prn Tylenol 975mg at 1609 for 8/10 ongoing tooth pain. On reassessment pain is now 3/10. Denies any other needs at this time.
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
## 2024-02-05 NOTE — NURSING NOTE
Pt requested and received tylenol 975 for tooth pain 9/10. Pt states medication was mildly helpful and then requested ibuprofen.

## 2024-02-05 NOTE — PLAN OF CARE
Problem: PSYCHOSIS  Goal: Will report no hallucinations or delusions  Description: Interventions:  - Administer medication as  ordered  - Every waking shifts and PRN assess for the presence of hallucinations and or delusions  - Assist with reality testing to support increasing orientation  - Assess if patient's hallucinations or delusions are encouraging self-harm or harm to others and intervene as appropriate  Outcome: Progressing     Problem: ANXIETY  Goal: Will report anxiety at manageable levels  Description: INTERVENTIONS:  - Administer medication as ordered  - Teach and encourage coping skills  - Provide emotional support  - Assess patient/family for anxiety and ability to cope  Outcome: Progressing     Problem: SLEEP DISTURBANCE  Goal: Will exhibit normal sleeping pattern  Description: Interventions:  -  Assess the patients sleep pattern, noting recent changes  - Administer medication as ordered  - Decrease environmental stimuli, including noise, as appropriate during the night  - Encourage the patient to actively participate in unit groups and or exercise during the day to enhance ability to achieve adequate sleep at night  - Assess the patient, in the morning, encouraging a description of sleep experience  Outcome: Progressing     Problem: Ineffective Coping  Goal: Identifies healthy coping skills  Outcome: Progressing

## 2024-02-05 NOTE — NURSING NOTE
Patient requested trazodone 50 mg at 2150. Upon reassessment, PRN was effective as patient appears to be sleeping.

## 2024-02-05 NOTE — NURSING NOTE
Pt received ibuprofen 400 mg for recurring toothache. PL 3/10 Upon follow up pt said medication was helpful and pain is now at a manageable level.  Pt concerned about potential future infection in his tooth pt encouraged to follow up OP.

## 2024-02-05 NOTE — PROGRESS NOTES
Progress Note - Behavioral Health   Moises Valdez 43 y.o. male MRN: [id number]  [facility] Encounter: [id number]    Assessment:  Principal Problem:    Bipolar affective disorder, currently manic, moderate (HCC)  Active Problems:    HTN (hypertension)    MILAGRO (obstructive sleep apnea)    Medical clearance for psychiatric admission    Elevated TSH      Plan:  --VPA level 28 on 2/4/24 (patient does not wish to go higher on dose)  --Discharge pending collaboration with CM and collateral contacts  --Continue with psychiatric hospitalization  --Continue with individual, group, and milieu therapy  --Continue the following medications:  Current Facility-Administered Medications   Medication Dose Route Frequency    acetaminophen (TYLENOL) tablet 650 mg  650 mg Oral Q6H PRN    acetaminophen (TYLENOL) tablet 650 mg  650 mg Oral Q4H PRN    acetaminophen (TYLENOL) tablet 975 mg  975 mg Oral Q6H PRN    aluminum-magnesium hydroxide-simethicone (MAALOX) oral suspension 30 mL  30 mL Oral Q4H PRN    amLODIPine (NORVASC) tablet 10 mg  10 mg Oral Daily    aspirin chewable tablet 81 mg  81 mg Oral Daily    atorvastatin (LIPITOR) tablet 40 mg  40 mg Oral Daily With Dinner    haloperidol lactate (HALDOL) injection 2.5 mg  2.5 mg Intramuscular Q4H PRN Max 4/day    And    LORazepam (ATIVAN) injection 1 mg  1 mg Intramuscular Q4H PRN Max 4/day    And    benztropine (COGENTIN) injection 0.5 mg  0.5 mg Intramuscular Q4H PRN Max 4/day    haloperidol lactate (HALDOL) injection 5 mg  5 mg Intramuscular Q4H PRN Max 4/day    And    LORazepam (ATIVAN) injection 2 mg  2 mg Intramuscular Q4H PRN Max 4/day    And    benztropine (COGENTIN) injection 1 mg  1 mg Intramuscular Q4H PRN Max 4/day    benztropine (COGENTIN) tablet 1 mg  1 mg Oral Q4H PRN Max 6/day    bisacodyl (DULCOLAX) rectal suppository 10 mg  10 mg Rectal Daily PRN    cariprazine (VRAYLAR) capsule 3 mg  3 mg Oral Daily    hydrOXYzine HCL (ATARAX) tablet 50 mg  50 mg Oral Q6H PRN Max  4/day    Or    diphenhydrAMINE (BENADRYL) injection 50 mg  50 mg Intramuscular Q6H PRN    divalproex sodium (DEPAKOTE ER) 24 hr tablet 750 mg  750 mg Oral Daily    ergocalciferol (VITAMIN D2) capsule 50,000 Units  50,000 Units Oral Weekly    haloperidol (HALDOL) tablet 1 mg  1 mg Oral Q6H PRN    haloperidol (HALDOL) tablet 2.5 mg  2.5 mg Oral Q4H PRN Max 4/day    haloperidol (HALDOL) tablet 5 mg  5 mg Oral Q4H PRN Max 4/day    hydrOXYzine HCL (ATARAX) tablet 100 mg  100 mg Oral Q6H PRN Max 4/day    Or    LORazepam (ATIVAN) injection 2 mg  2 mg Intramuscular Q6H PRN    hydrOXYzine HCL (ATARAX) tablet 25 mg  25 mg Oral Q6H PRN Max 4/day    ibuprofen (MOTRIN) tablet 400 mg  400 mg Oral Q8H PRN    lisinopril (ZESTRIL) tablet 10 mg  10 mg Oral Daily    nicotine (NICODERM CQ) 21 mg/24 hr TD 24 hr patch 1 patch  1 patch Transdermal Daily    nicotine polacrilex (NICORETTE) gum 2 mg  2 mg Oral Q2H PRN    pantoprazole (PROTONIX) EC tablet 20 mg  20 mg Oral Early Morning    polyethylene glycol (MIRALAX) packet 17 g  17 g Oral Daily PRN    senna-docusate sodium (SENOKOT S) 8.6-50 mg per tablet 1 tablet  1 tablet Oral Daily PRN    traZODone (DESYREL) tablet 50 mg  50 mg Oral HS PRN       Subjective: Patient was seen for continuation of care. Chart was reviewed and discussed with treatment team.     No acute behavioral events over the past 24 hours. Today, patient was seen and examined at bedside for continuation of care.     Today, the patient is feeling better.  He is tolerating Depakote with no side effects.  He is not feeling suicidal and feels as though his impulsivity and racing thoughts have been well-controlled.  He articulated a readiness for discharge.  We discussed plan to have our  touch base with his girlfriend to make sure there are no concerns about the patient coming home with a tentative discharge date for tomorrow.  The patient is agreeable with this plan.  He is very future oriented.  He is able to  "articulate a safety plan for his safe return to the community.  Despite his subtherapeutic Depakote level, the patient does not wish to go up higher on the dose.    Patient denied adverse effects to their psychiatric medication regimen. Patient denied other new or worsening psychiatric symptoms/complaints at this time. Discussed the importance of continuing to take medications as prescribed, as well as the importance of continuing to attend groups on the unit.     Psychiatric Review of Systems:  Medication adverse effects: none  Sleep: unchanged  Appetite: unchanged  Behavior over the past 24 hours: as per above    Vitals:  Vitals:    02/04/24 1533   BP: 141/89   Pulse: 93   Resp: 20   Temp: 97.8 °F (36.6 °C)   SpO2: 99%       Laboratory results:    I have personally reviewed all pertinent laboratory/tests results  Recent Results (from the past 48 hour(s))   Valproic acid level, total    Collection Time: 02/04/24  5:32 AM   Result Value Ref Range    Valproic Acid, Total 28 (L) 50 - 100 ug/mL        Current Medications:  Current medications as per above. All medications have been reviewed.   Risks, benefits, alternatives, and possible side effects of patient's psychiatric medications were discussed with patient.     Mental Status Evaluation:  Appearance: Moderately kempt   motor: Normal, no gait abnormalities  Behavior: Cooperative  Speech: Normal  Mood: \"better\"  Affect: Mildly expansive  Thought Process: Linear thought Content: denies auditory hallucinations, denies visual hallucinations, denies delusions  Risk Potential: denies suicidal ideation, plan, or intent. Denies homicidal ideation  Sensorium: Oriented to person, place, time, and situation  Cognition: cognitive ability appears intact but was not quantitatively tested  Consciousness: alert and awake  Attention: currently intact  Insight: Good  Judgement: Fair      Progress Toward Goals & Illness Status: Patient is not at goal. They are not yet ready for " discharge. The patient's condition currently requires active psychopharmacological medication management, interdisciplinary coordination with case management, and the utilization of adjunctive milieu and group therapy to augment psychopharmacological efficacy. The patient's risk of morbidity, and progression or decompensation of psychiatric disease, is higher without this current treatment.       This note has been constructed using a voice recognition system. There may be translation, syntax, or grammatical errors. If you have any questions, please contact the dictating provider.

## 2024-02-05 NOTE — NURSING NOTE
Moises is calm upon approach, observed walking in the halls and socializing with select peers on the unit. Patient denies current SI/HI/AVH, endorses improvement in mood since time of admission. Moises reports that he is ready for discharge tomorrow. Patient continues to endorse intermittent tooth pain and has plans to follow-up outpatient after discharge. Moises attends scheduled groups when encouraged by staff and denies questions and/or concerns at this time.

## 2024-02-05 NOTE — NURSING NOTE
Pt calm and cooperative today. Pt denies anxiety or depression. Denies SI/HI/AVH. Pt observed attending group this morning. Denies any needs at present.

## 2024-02-06 VITALS
DIASTOLIC BLOOD PRESSURE: 78 MMHG | SYSTOLIC BLOOD PRESSURE: 137 MMHG | HEART RATE: 81 BPM | WEIGHT: 247.6 LBS | BODY MASS INDEX: 35.45 KG/M2 | RESPIRATION RATE: 17 BRPM | OXYGEN SATURATION: 99 % | HEIGHT: 70 IN | TEMPERATURE: 97.1 F

## 2024-02-06 PROCEDURE — 99239 HOSP IP/OBS DSCHRG MGMT >30: CPT | Performed by: PSYCHIATRY & NEUROLOGY

## 2024-02-06 RX ORDER — ATORVASTATIN CALCIUM 40 MG/1
40 TABLET, FILM COATED ORAL
Qty: 30 TABLET | Refills: 0 | Status: SHIPPED | OUTPATIENT
Start: 2024-02-06 | End: 2024-03-07

## 2024-02-06 RX ORDER — DIVALPROEX SODIUM 250 MG/1
750 TABLET, EXTENDED RELEASE ORAL DAILY
Qty: 90 TABLET | Refills: 0 | Status: SHIPPED | OUTPATIENT
Start: 2024-02-06 | End: 2024-03-07

## 2024-02-06 RX ORDER — ERGOCALCIFEROL 1.25 MG/1
50000 CAPSULE ORAL WEEKLY
Qty: 4 CAPSULE | Refills: 1 | Status: SHIPPED | OUTPATIENT
Start: 2024-02-08 | End: 2024-03-15

## 2024-02-06 RX ORDER — AMLODIPINE BESYLATE 10 MG/1
10 TABLET ORAL DAILY
Qty: 30 TABLET | Refills: 0 | Status: SHIPPED | OUTPATIENT
Start: 2024-02-06 | End: 2024-03-07

## 2024-02-06 RX ORDER — IBUPROFEN 400 MG/1
400 TABLET ORAL EVERY 8 HOURS PRN
Status: DISCONTINUED | OUTPATIENT
Start: 2024-02-06 | End: 2024-02-06 | Stop reason: HOSPADM

## 2024-02-06 RX ADMIN — LISINOPRIL 10 MG: 10 TABLET ORAL at 08:31

## 2024-02-06 RX ADMIN — PANTOPRAZOLE SODIUM 20 MG: 20 TABLET, DELAYED RELEASE ORAL at 05:57

## 2024-02-06 RX ADMIN — CARIPRAZINE 3 MG: 3 CAPSULE, GELATIN COATED ORAL at 08:32

## 2024-02-06 RX ADMIN — ASPIRIN 81 MG CHEWABLE TABLET 81 MG: 81 TABLET CHEWABLE at 08:32

## 2024-02-06 RX ADMIN — AMLODIPINE BESYLATE 10 MG: 5 TABLET ORAL at 08:32

## 2024-02-06 RX ADMIN — IBUPROFEN 400 MG: 400 TABLET, FILM COATED ORAL at 04:03

## 2024-02-06 RX ADMIN — NICOTINE 1 PATCH: 21 PATCH, EXTENDED RELEASE TRANSDERMAL at 08:48

## 2024-02-06 RX ADMIN — DIVALPROEX SODIUM 750 MG: 250 TABLET, EXTENDED RELEASE ORAL at 08:31

## 2024-02-06 NOTE — PLAN OF CARE
Problem: DISCHARGE PLANNING  Goal: Discharge to home or other facility with appropriate resources  Description: INTERVENTIONS:  - Identify barriers to discharge w/patient and caregiver  - Arrange for needed discharge resources and transportation as appropriate  - Identify discharge learning needs (meds, wound care, etc.)  - Arrange for interpretive services to assist at discharge as needed  - Refer to Case Management Department for coordinating discharge planning if the patient needs post-hospital services based on physician/advanced practitioner order or complex needs related to functional status, cognitive ability, or social support system  Outcome: Adequate for Discharge  Note: Pt is scheduled to be discharged today to return to his girlfriend's residence. Pt is scheduled to start PHP with [facility].

## 2024-02-06 NOTE — CASE MANAGEMENT
CM met with Pt to discuss discharge planning. CM reminded Pt of his start with [facility]. CM stated that they have a return to work letter for them dated to return to work on 2/26/2024 since his PHP ends on 2/23/2024. Pt stated that will be fine. CM asked if the Pt got in touch with his work. Pt stated that they have informed him that he has to go to their office that handles the disability and leave claims. Pt stated that he plans to go there after he discharges today. CM stated that their information and fax number will be on his AVS. CM stated that they will schedule him with a Lyft for discharge today. Pt verbalized understanding of the discharge plan and stated he is ready for discharge.     CM scheduled [employer] to pick the Pt up @10am.

## 2024-02-06 NOTE — NURSING NOTE
"Pt received prn motrin 400mg PO @ 0403 for upper right dental pain rated 5/10. Pt was encouraged to take tylenol rather than motrin, however pt refused, stating \"It just doesn't work as well.\" Upon follow-up, pt was observed laying quietly in bed, no s/s of distress observed.    "
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
## 2024-02-06 NOTE — PLAN OF CARE
Problem: DEPRESSION  Goal: Will be euthymic at discharge  Description: INTERVENTIONS:  - Administer medication as ordered  - Provide emotional support via 1:1 interaction with staff  - Encourage involvement in milieu/groups/activities  - Monitor for social isolation  Outcome: Adequate for Discharge     Problem: ROSALEE  Goal: Will exhibit normal sleep and speech and no impulsivity  Description: INTERVENTIONS:  - Administer medication as ordered  - Set limits on impulsive behavior  - Make attempts to decrease external stimuli as possible  Outcome: Adequate for Discharge     Problem: PSYCHOSIS  Goal: Will report no hallucinations or delusions  Description: Interventions:  - Administer medication as  ordered  - Every waking shifts and PRN assess for the presence of hallucinations and or delusions  - Assist with reality testing to support increasing orientation  - Assess if patient's hallucinations or delusions are encouraging self-harm or harm to others and intervene as appropriate  Outcome: Adequate for Discharge     Problem: ANXIETY  Goal: Will report anxiety at manageable levels  Description: INTERVENTIONS:  - Administer medication as ordered  - Teach and encourage coping skills  - Provide emotional support  - Assess patient/family for anxiety and ability to cope  Outcome: Adequate for Discharge  Goal: By discharge: Patient will verbalize 2 strategies to deal with anxiety  Description: Interventions:  - Identify any obvious source/trigger to anxiety  - Staff will assist patient in applying identified coping technique/skills  - Encourage attendance of scheduled groups and activities  Outcome: Adequate for Discharge     Problem: SLEEP DISTURBANCE  Goal: Will exhibit normal sleeping pattern  Description: Interventions:  -  Assess the patients sleep pattern, noting recent changes  - Administer medication as ordered  - Decrease environmental stimuli, including noise, as appropriate during the night  - Encourage the  patient to actively participate in unit groups and or exercise during the day to enhance ability to achieve adequate sleep at night  - Assess the patient, in the morning, encouraging a description of sleep experience  Outcome: Adequate for Discharge     Problem: DISCHARGE PLANNING  Goal: Discharge to home or other facility with appropriate resources  Description: INTERVENTIONS:  - Identify barriers to discharge w/patient and caregiver  - Arrange for needed discharge resources and transportation as appropriate  - Identify discharge learning needs (meds, wound care, etc.)  - Arrange for interpretive services to assist at discharge as needed  - Refer to Case Management Department for coordinating discharge planning if the patient needs post-hospital services based on physician/advanced practitioner order or complex needs related to functional status, cognitive ability, or social support system  Outcome: Adequate for Discharge     Problem: Ineffective Coping  Goal: Identifies healthy coping skills  Outcome: Adequate for Discharge  Goal: Participates in unit activities  Description: Interventions:  - Provide therapeutic environment   - Provide required programming   - Redirect inappropriate behaviors   Outcome: Adequate for Discharge

## 2024-02-06 NOTE — NURSING NOTE
Pt received prn Trazodone 50mg at 2222, on reassessment at this time pt appears to be sleeping without distress, respirations regular.

## 2024-02-06 NOTE — BH TRANSITION RECORD
Contact Information: If you have any questions, concerns, pended studies, tests and/or procedures, or emergencies regarding your inpatient behavioral health visit. Please contact [place] [facility] unit [phone] and ask to speak to a , nurse or physician. A contact is available 24 hours/ 7 days a week at this number.     Summary of Procedures Performed During your Stay:  Below is a list of major procedures performed during your hospital stay and a summary of results:    Depakote level - 28    ECG:  Normal sinus rhythm  Nonspecific T wave abnormality  Abnormal ECG  When compared with ECG of 29-JAN-2024 18:09,  No significant change was found    Pending Studies (From admission, onward)      None          Please follow up on the above pending studies with your PCP and/or referring provider.

## 2024-02-06 NOTE — PROGRESS NOTES
02/06/24 0750   Team Meeting   Meeting Type Daily Rounds   Team Members Present   Team Members Present Physician;Nurse;;Other (Discipline and Name)   Physician Team Member Dr. Muniz / Dr. Gamez / DEMI Medina / PA Student   Nursing Team Member Moe / Bety   Care Management Team Member Justyna / Sanjeev / Les / Iram   Other (Discipline and Name) Shannonund (Group Facilitator)   Patient/Family Present   Patient Present No   Patient's Family Present No       Status: Pt is calm and social with peers and attending groups. Pt is denying SI, HI, and AVH. Pt slept through the night and is ready for discharge.     Medication: Pt received PRN Tylenol, Ibuprofen, and Trazodone for tooth pain and sleep.     D/C: Pt is scheduled to be discharged today to return to his girlfriend's residence. Pt is being discharged to start PHP through - [facility].

## 2024-02-06 NOTE — NURSING NOTE
Patient states he plans to stop soking , applied nicoderm patch 21mg/24 hr. Patient educated ot to smoke if

## 2024-02-06 NOTE — NURSING NOTE
Pt is calm and cooperative, pleasant on approach.  Expresses readiness for discharge today.  Denies SI/HI/AVH.    AVS reviewed and prescriptions have been e-prescribed.  Pt expresses understanding.  Denies any questions or concerns.    Pt discharged from unit via [employer].

## 2024-02-06 NOTE — NURSING NOTE
"Pt received prn ibuprofen 400mg at 1953 for 5/10 tooth pain. On reassessment pt reports \"it's not hurting right now.\"   "